# Patient Record
Sex: MALE | Race: WHITE | NOT HISPANIC OR LATINO | URBAN - METROPOLITAN AREA
[De-identification: names, ages, dates, MRNs, and addresses within clinical notes are randomized per-mention and may not be internally consistent; named-entity substitution may affect disease eponyms.]

---

## 2023-05-04 ENCOUNTER — INPATIENT HOSPITAL (OUTPATIENT)
Dept: URBAN - METROPOLITAN AREA HOSPITAL 50 | Facility: HOSPITAL | Age: 53
End: 2023-05-04

## 2023-05-04 DIAGNOSIS — R11.2 NAUSEA WITH VOMITING, UNSPECIFIED: ICD-10-CM

## 2023-05-04 DIAGNOSIS — K76.0 FATTY (CHANGE OF) LIVER, NOT ELSEWHERE CLASSIFIED: ICD-10-CM

## 2023-05-04 DIAGNOSIS — K21.9 GASTRO-ESOPHAGEAL REFLUX DISEASE WITHOUT ESOPHAGITIS: ICD-10-CM

## 2023-05-04 DIAGNOSIS — K85.90 ACUTE PANCREATITIS WITHOUT NECROSIS OR INFECTION, UNSPECIFIE: ICD-10-CM

## 2023-05-04 PROCEDURE — 99222 1ST HOSP IP/OBS MODERATE 55: CPT | Performed by: INTERNAL MEDICINE

## 2023-05-05 ENCOUNTER — INPATIENT HOSPITAL (OUTPATIENT)
Dept: URBAN - METROPOLITAN AREA HOSPITAL 50 | Facility: HOSPITAL | Age: 53
End: 2023-05-05

## 2023-05-05 DIAGNOSIS — K21.9 GASTRO-ESOPHAGEAL REFLUX DISEASE WITHOUT ESOPHAGITIS: ICD-10-CM

## 2023-05-05 DIAGNOSIS — R11.2 NAUSEA WITH VOMITING, UNSPECIFIED: ICD-10-CM

## 2023-05-05 DIAGNOSIS — K76.0 FATTY (CHANGE OF) LIVER, NOT ELSEWHERE CLASSIFIED: ICD-10-CM

## 2023-05-05 DIAGNOSIS — K85.90 ACUTE PANCREATITIS WITHOUT NECROSIS OR INFECTION, UNSPECIFIE: ICD-10-CM

## 2023-05-05 PROCEDURE — 99233 SBSQ HOSP IP/OBS HIGH 50: CPT | Performed by: NURSE PRACTITIONER

## 2024-01-16 ENCOUNTER — APPOINTMENT (OUTPATIENT)
Dept: SURGERY | Facility: CLINIC | Age: 54
End: 2024-01-16
Payer: COMMERCIAL

## 2024-01-16 NOTE — PROGRESS NOTES
Subjective   Patient ID: Marlon Valenzuela is a 53 y.o. male who has a hx of a perianal abscess with mushroom drain placement in 2017.    HPI  He noticed another lump near the anus and had an I&D in the ED a few times over the years. He went to Dr. Ochoa and the abscess was opened again and then 2 months later. He then developed a right groin abscess and went to the OR to have it drained and that abscess has healed.. He is still having drainage at the base of the scrotum and he will use hot compresses to make it drain and that will make the abscess almost subside.    MRI pelvis 5/2023   IMPRESSION:  No perianal fistula identified.     Two separate narrow subcutaneous tracts of fluid along the posterior  perineum, perhaps the residua of prior fluid collections, possibly  draining to the skin, but neither of which appear to communicate with  the perianal region.    Colonoscopy/EGD ?    Review of Systems    Objective   Physical Exam    Assessment/Plan   {Assess/PlanSmartLinks:94712}         TASHI Cantu-SINAI 01/15/24 8:50 PM

## 2024-01-17 ENCOUNTER — OFFICE VISIT (OUTPATIENT)
Dept: SURGERY | Facility: CLINIC | Age: 54
End: 2024-01-17
Payer: MEDICARE

## 2024-01-17 VITALS
TEMPERATURE: 97.4 F | HEIGHT: 72 IN | BODY MASS INDEX: 36.7 KG/M2 | WEIGHT: 271 LBS | HEART RATE: 62 BPM | DIASTOLIC BLOOD PRESSURE: 88 MMHG | SYSTOLIC BLOOD PRESSURE: 123 MMHG

## 2024-01-17 DIAGNOSIS — K61.0 PERIANAL ABSCESS: ICD-10-CM

## 2024-01-17 PROCEDURE — 99215 OFFICE O/P EST HI 40 MIN: CPT | Performed by: STUDENT IN AN ORGANIZED HEALTH CARE EDUCATION/TRAINING PROGRAM

## 2024-01-17 RX ORDER — ESOMEPRAZOLE MAGNESIUM 40 MG/1
40 CAPSULE, DELAYED RELEASE ORAL 2 TIMES DAILY
COMMUNITY
Start: 2024-01-11 | End: 2024-01-25

## 2024-01-17 RX ORDER — GABAPENTIN 600 MG/1
TABLET ORAL
COMMUNITY
Start: 2019-02-13 | End: 2024-01-20

## 2024-01-17 RX ORDER — DULOXETINE HYDROCHLORIDE 30 MG/1
CAPSULE, DELAYED RELEASE ORAL
COMMUNITY
Start: 2023-11-09

## 2024-01-17 RX ORDER — LISINOPRIL 10 MG/1
1 TABLET ORAL DAILY
COMMUNITY

## 2024-01-17 RX ORDER — FLUTICASONE FUROATE, UMECLIDINIUM BROMIDE AND VILANTEROL TRIFENATATE 100; 62.5; 25 UG/1; UG/1; UG/1
1 POWDER RESPIRATORY (INHALATION)
COMMUNITY
Start: 2023-10-05

## 2024-01-17 RX ORDER — ESCITALOPRAM OXALATE 10 MG/1
10 TABLET ORAL
COMMUNITY
Start: 2023-03-31

## 2024-01-17 RX ORDER — FENOFIBRATE 160 MG/1
160 TABLET ORAL DAILY
COMMUNITY
Start: 2023-02-16

## 2024-01-17 RX ORDER — ATORVASTATIN CALCIUM 20 MG/1
20 TABLET, FILM COATED ORAL
COMMUNITY
Start: 2023-12-20 | End: 2024-01-19

## 2024-01-17 RX ORDER — ATENOLOL 25 MG/1
25 TABLET ORAL
COMMUNITY
Start: 2023-03-31

## 2024-01-17 ASSESSMENT — ENCOUNTER SYMPTOMS
NAUSEA: 0
COUGH: 0
RECTAL PAIN: 1
ABDOMINAL PAIN: 0
VOMITING: 0
UNEXPECTED WEIGHT CHANGE: 0
APPETITE CHANGE: 0
CHILLS: 0
FEVER: 0
FATIGUE: 0
ACTIVITY CHANGE: 0
ANAL BLEEDING: 0
SHORTNESS OF BREATH: 0
BLOOD IN STOOL: 0
CONSTIPATION: 0
DIARRHEA: 0
CHEST TIGHTNESS: 0
DIAPHORESIS: 0

## 2024-01-17 NOTE — H&P (VIEW-ONLY)
HPI    aMrlon Valenzuela is a 53 y.o. male who was seen by Brenda Aguila CNP 5/2023 for a recurrent perianal abscess at the base of his scrotum. He was given a course of Augmentin and MRI pelvis was obtained. He had EUA, I&D with mushroom drain placed in 2017 by [facility]. He reports that he had an I&D performed in the ED a few times over the years. He has build up and drainage at least once a month. He reports that he had 2 surgeries on his bottom with Dr. Ochoa and was admitted for pancreatitis since his MRI in May.     MRI pelvis 5/2023: No perianal fistula identified. Two separate narrow subcutaneous tracts of fluid along the posterior perineum, perhaps the residua of prior fluid collections, possibly draining to the skin, but neither of which appear to communicate with the perianal region.     Colonoscopy 5/2023 ([facility]):   - Preparation of the colon was fair.   - Preparation of the colon was fair.   - The examined portion of the ileum was normal.   - Stool in the rectum and in the sigmoid colon.   - The examination was otherwise normal.   - The distal rectum and anal verge are normal on retroflexion view.   - No specimens collected.   -Repeat in 5 years.     Former smoker- quit 1 month ago and smoked since he was 10/No ETOH/No Illicit drug use  PMH: HTN, COPD, HLD, pancreatitis, LIBIA  PSH: Liver biopsy   No family history of CRC or IBD  Employment: [employer]    Past Medical History:   Diagnosis Date    Acute embolism and thrombosis of unspecified vein 04/20/2015    Venous thrombosis    Other intervertebral disc degeneration, lumbar region     Bulging lumbar disc    Personal history of other diseases of the circulatory system     History of aortic aneurysm    Personal history of other diseases of the nervous system and sense organs     History of sleep apnea    Personal history of other specified conditions 04/19/2016    History of headache    Personal history of other specified conditions 04/20/2015    History of chest  pain    Spondylosis, unspecified     Arthritis of back    White matter disease, unspecified     White matter abnormality on MRI of brain       Past Surgical History:   Procedure Laterality Date    CARDIAC CATHETERIZATION  10/21/2014    Cardiac Cath Procedure Outcome:    OTHER SURGICAL HISTORY  05/11/2017    Incision Of Perianal Abscess     Current Outpatient Medications   Medication Sig Dispense Refill    aspirin-calcium carbonate 81 mg-300 mg calcium(777 mg) tablet Take 1 tablet by mouth once daily.      atenolol (Tenormin) 25 mg tablet Take 1 tablet (25 mg) by mouth once daily.      atorvastatin (Lipitor) 20 mg tablet Take 1 tablet (20 mg) by mouth once daily.      Cymbalta 30 mg DR capsule Take by mouth.      escitalopram (Lexapro) 10 mg tablet Take 1 tablet (10 mg) by mouth once daily.      esomeprazole (NexIUM) 40 mg DR capsule Take 1 capsule (40 mg) by mouth twice a day.      fenofibrate (Triglide) 160 mg tablet Take 1 tablet (160 mg) by mouth once daily.      gabapentin (Neurontin) 600 mg tablet TAKE 1 TABLET BY MOUTH IN THE MORNING  1 TABLET AT NOON  AND 2 TABLETS AT NIGHT      lisinopril 10 mg tablet Take 1 tablet (10 mg) by mouth once daily.      Trelegy Ellipta 100-62.5-25 mcg blister with device Inhale 1 puff once daily.       No current facility-administered medications for this visit.     Allergies   Allergen Reactions    Sulfa (Sulfonamide Antibiotics) GI Upset       Review of Systems   Constitutional:  Negative for activity change, appetite change, chills, diaphoresis, fatigue, fever and unexpected weight change.   Respiratory:  Negative for cough, chest tightness and shortness of breath.    Gastrointestinal:  Positive for rectal pain. Negative for abdominal pain, anal bleeding, blood in stool, constipation, diarrhea, nausea and vomiting.   All other systems reviewed and are negative.      Physical Exam  Vitals reviewed. Exam conducted with a chaperone present.   Constitutional:       Appearance:  Normal appearance.   HENT:      Head: Normocephalic.   Eyes:      Pupils: Pupils are equal, round, and reactive to light.   Cardiovascular:      Rate and Rhythm: Normal rate and regular rhythm.      Pulses: Normal pulses.      Heart sounds: Normal heart sounds.   Pulmonary:      Effort: Pulmonary effort is normal.      Breath sounds: Normal breath sounds.   Abdominal:      General: There is no distension.      Palpations: Abdomen is soft. There is no mass.      Tenderness: There is no abdominal tenderness.      Hernia: No hernia is present.   Genitourinary:     Comments: Left anterior pinhole opening without fluctuance or drainage   Musculoskeletal:         General: Normal range of motion.      Cervical back: Normal range of motion.   Skin:     General: Skin is warm and dry.      Capillary Refill: Capillary refill takes less than 2 seconds.   Neurological:      General: No focal deficit present.      Mental Status: He is alert and oriented to person, place, and time. Mental status is at baseline.   Psychiatric:         Mood and Affect: Mood normal.         Behavior: Behavior normal.         Thought Content: Thought content normal.         Judgment: Judgment normal.     Anorectal:  Small pinpoint sinus in the left anterior position without surrounding erythema/edema.  There is minimal induration but no edema.  Area is mildly TTP.  No other abnormalities visible.    Interpreted By:  JUJU BARRIOS MD  MRN: [id number]  Patient Name: LYRIC BURCIAGA     STUDY:  MRI PELVIS W/O-W CONTRAST;  5/30/2023 12:59 pm     INDICATION:  anal fistula  K60.3: Anal fistula     COMPARISON:  None.     ACCESSION NUMBER(S):  [id number]     ORDERING CLINICIAN:  JAMES KERR     TECHNIQUE:  Multiplanar MRI of the pelvis was obtained including axial, sagittal  and coronal T2 weighted SSFSE, (para)axial, (para)coronal and  sagittal T2 FSE , axial DWI, pre and post gadolinium dynamic T1 GRE  sequences in 3 planes.  24 ml of Gadolinium contrast  agent Dotarem were administered  intravenously without complication.     FINDINGS:  No perianal fistula is identified.  Within the right posterior perineum, there is a narrow tract of fluid  with hyperenhancement measuring about 11 mm in length on series 9,  image 32/54.  Within the posterior perineum extending into the medial left thigh,  there is a separate approximately 11 mm in length narrow subcutaneous  tract of fluid as annotated on series 9, image 40/54. This  demonstrates some hyperenhancement. These may empty into the skin  however neither extends to the perianal region.     The anal canal appears intact.     Distal loops of bowel do not appear dilated. No pelvic free fluid.     Urinary bladder is nondistended.     Prostate measures 4.1 cm transverse. Visualized portion of the  seminal vesicles appears symmetric.     No pelvic lymphadenopathy.     No abnormal marrow enhancement is visualized.     IMPRESSION:  No perianal fistula identified.     Two separate narrow subcutaneous tracts of fluid along the posterior  perineum, perhaps the residua of prior fluid collections, possibly  draining to the skin, but neither of which appear to communicate with  the perianal region.    Assessment and Plan:   #Perianal abscess vs subcutaneous fistula, left anterior position, no fistula on MRI exam  -  For EUA, possible fistulotomy, possible I&D abscess, possible insertion jose Portillo MD   1/17/2024  9:25 AM

## 2024-01-17 NOTE — PROGRESS NOTES
HPI    Marlon Valenzuela is a 53 y.o. male who was seen by Brenda Aguila CNP 5/2023 for a recurrent perianal abscess at the base of his scrotum. He was given a course of Augmentin and MRI pelvis was obtained. He had EUA, I&D with mushroom drain placed in 2017 by [facility]. He reports that he had an I&D performed in the ED a few times over the years. He has build up and drainage at least once a month. He reports that he had 2 surgeries on his bottom with Dr. Ochoa and was admitted for pancreatitis since his MRI in May.     MRI pelvis 5/2023: No perianal fistula identified. Two separate narrow subcutaneous tracts of fluid along the posterior perineum, perhaps the residua of prior fluid collections, possibly draining to the skin, but neither of which appear to communicate with the perianal region.     Colonoscopy 5/2023 ([facility]):   - Preparation of the colon was fair.   - Preparation of the colon was fair.   - The examined portion of the ileum was normal.   - Stool in the rectum and in the sigmoid colon.   - The examination was otherwise normal.   - The distal rectum and anal verge are normal on retroflexion view.   - No specimens collected.   -Repeat in 5 years.     Former smoker- quit 1 month ago and smoked since he was 10/No ETOH/No Illicit drug use  PMH: HTN, COPD, HLD, pancreatitis, LIBIA  PSH: Liver biopsy   No family history of CRC or IBD  Employment: [employer]    Past Medical History:   Diagnosis Date    Acute embolism and thrombosis of unspecified vein 04/20/2015    Venous thrombosis    Other intervertebral disc degeneration, lumbar region     Bulging lumbar disc    Personal history of other diseases of the circulatory system     History of aortic aneurysm    Personal history of other diseases of the nervous system and sense organs     History of sleep apnea    Personal history of other specified conditions 04/19/2016    History of headache    Personal history of other specified conditions 04/20/2015    History of chest  pain    Spondylosis, unspecified     Arthritis of back    White matter disease, unspecified     White matter abnormality on MRI of brain       Past Surgical History:   Procedure Laterality Date    CARDIAC CATHETERIZATION  10/21/2014    Cardiac Cath Procedure Outcome:    OTHER SURGICAL HISTORY  05/11/2017    Incision Of Perianal Abscess     Current Outpatient Medications   Medication Sig Dispense Refill    aspirin-calcium carbonate 81 mg-300 mg calcium(777 mg) tablet Take 1 tablet by mouth once daily.      atenolol (Tenormin) 25 mg tablet Take 1 tablet (25 mg) by mouth once daily.      atorvastatin (Lipitor) 20 mg tablet Take 1 tablet (20 mg) by mouth once daily.      Cymbalta 30 mg DR capsule Take by mouth.      escitalopram (Lexapro) 10 mg tablet Take 1 tablet (10 mg) by mouth once daily.      esomeprazole (NexIUM) 40 mg DR capsule Take 1 capsule (40 mg) by mouth twice a day.      fenofibrate (Triglide) 160 mg tablet Take 1 tablet (160 mg) by mouth once daily.      gabapentin (Neurontin) 600 mg tablet TAKE 1 TABLET BY MOUTH IN THE MORNING  1 TABLET AT NOON  AND 2 TABLETS AT NIGHT      lisinopril 10 mg tablet Take 1 tablet (10 mg) by mouth once daily.      Trelegy Ellipta 100-62.5-25 mcg blister with device Inhale 1 puff once daily.       No current facility-administered medications for this visit.     Allergies   Allergen Reactions    Sulfa (Sulfonamide Antibiotics) GI Upset       Review of Systems   Constitutional:  Negative for activity change, appetite change, chills, diaphoresis, fatigue, fever and unexpected weight change.   Respiratory:  Negative for cough, chest tightness and shortness of breath.    Gastrointestinal:  Positive for rectal pain. Negative for abdominal pain, anal bleeding, blood in stool, constipation, diarrhea, nausea and vomiting.   All other systems reviewed and are negative.      Physical Exam  Vitals reviewed. Exam conducted with a chaperone present.   Constitutional:       Appearance:  Normal appearance.   HENT:      Head: Normocephalic.   Eyes:      Pupils: Pupils are equal, round, and reactive to light.   Cardiovascular:      Rate and Rhythm: Normal rate and regular rhythm.      Pulses: Normal pulses.      Heart sounds: Normal heart sounds.   Pulmonary:      Effort: Pulmonary effort is normal.      Breath sounds: Normal breath sounds.   Abdominal:      General: There is no distension.      Palpations: Abdomen is soft. There is no mass.      Tenderness: There is no abdominal tenderness.      Hernia: No hernia is present.   Genitourinary:     Comments: Left anterior pinhole opening without fluctuance or drainage   Musculoskeletal:         General: Normal range of motion.      Cervical back: Normal range of motion.   Skin:     General: Skin is warm and dry.      Capillary Refill: Capillary refill takes less than 2 seconds.   Neurological:      General: No focal deficit present.      Mental Status: He is alert and oriented to person, place, and time. Mental status is at baseline.   Psychiatric:         Mood and Affect: Mood normal.         Behavior: Behavior normal.         Thought Content: Thought content normal.         Judgment: Judgment normal.     Anorectal:  Small pinpoint sinus in the left anterior position without surrounding erythema/edema.  There is minimal induration but no edema.  Area is mildly TTP.  No other abnormalities visible.    Interpreted By:  JUJU BARRIOS MD  MRN: [id number]  Patient Name: LYRIC BURCIAGA     STUDY:  MRI PELVIS W/O-W CONTRAST;  5/30/2023 12:59 pm     INDICATION:  anal fistula  K60.3: Anal fistula     COMPARISON:  None.     ACCESSION NUMBER(S):  [id number]     ORDERING CLINICIAN:  JAMES KERR     TECHNIQUE:  Multiplanar MRI of the pelvis was obtained including axial, sagittal  and coronal T2 weighted SSFSE, (para)axial, (para)coronal and  sagittal T2 FSE , axial DWI, pre and post gadolinium dynamic T1 GRE  sequences in 3 planes.  24 ml of Gadolinium contrast  agent Dotarem were administered  intravenously without complication.     FINDINGS:  No perianal fistula is identified.  Within the right posterior perineum, there is a narrow tract of fluid  with hyperenhancement measuring about 11 mm in length on series 9,  image 32/54.  Within the posterior perineum extending into the medial left thigh,  there is a separate approximately 11 mm in length narrow subcutaneous  tract of fluid as annotated on series 9, image 40/54. This  demonstrates some hyperenhancement. These may empty into the skin  however neither extends to the perianal region.     The anal canal appears intact.     Distal loops of bowel do not appear dilated. No pelvic free fluid.     Urinary bladder is nondistended.     Prostate measures 4.1 cm transverse. Visualized portion of the  seminal vesicles appears symmetric.     No pelvic lymphadenopathy.     No abnormal marrow enhancement is visualized.     IMPRESSION:  No perianal fistula identified.     Two separate narrow subcutaneous tracts of fluid along the posterior  perineum, perhaps the residua of prior fluid collections, possibly  draining to the skin, but neither of which appear to communicate with  the perianal region.    Assessment and Plan:   #Perianal abscess vs subcutaneous fistula, left anterior position, no fistula on MRI exam  -  For EUA, possible fistulotomy, possible I&D abscess, possible insertion jose Portillo MD   1/17/2024  9:25 AM

## 2024-01-19 DIAGNOSIS — K61.0 PERIANAL ABSCESS: ICD-10-CM

## 2024-01-30 ENCOUNTER — ANESTHESIA EVENT (OUTPATIENT)
Dept: OPERATING ROOM | Facility: HOSPITAL | Age: 54
End: 2024-01-30
Payer: MEDICARE

## 2024-01-30 ENCOUNTER — HOSPITAL ENCOUNTER (OUTPATIENT)
Facility: HOSPITAL | Age: 54
Setting detail: OUTPATIENT SURGERY
Discharge: HOME | End: 2024-01-30
Attending: STUDENT IN AN ORGANIZED HEALTH CARE EDUCATION/TRAINING PROGRAM | Admitting: STUDENT IN AN ORGANIZED HEALTH CARE EDUCATION/TRAINING PROGRAM
Payer: MEDICARE

## 2024-01-30 ENCOUNTER — ANESTHESIA (OUTPATIENT)
Dept: OPERATING ROOM | Facility: HOSPITAL | Age: 54
End: 2024-01-30
Payer: MEDICARE

## 2024-01-30 VITALS
HEART RATE: 49 BPM | OXYGEN SATURATION: 95 % | BODY MASS INDEX: 35.21 KG/M2 | HEIGHT: 72 IN | TEMPERATURE: 97 F | SYSTOLIC BLOOD PRESSURE: 137 MMHG | DIASTOLIC BLOOD PRESSURE: 86 MMHG | RESPIRATION RATE: 14 BRPM | WEIGHT: 260 LBS

## 2024-01-30 DIAGNOSIS — K61.0 PERIANAL ABSCESS: ICD-10-CM

## 2024-01-30 DIAGNOSIS — L02.215 PERINEAL ABSCESS: Primary | ICD-10-CM

## 2024-01-30 PROCEDURE — 0752T DGTZ GLS MCRSCP SLD LVL III: CPT | Mod: TC,SUR,STJLAB | Performed by: STUDENT IN AN ORGANIZED HEALTH CARE EDUCATION/TRAINING PROGRAM

## 2024-01-30 PROCEDURE — 7100000009 HC PHASE TWO TIME - INITIAL BASE CHARGE: Performed by: STUDENT IN AN ORGANIZED HEALTH CARE EDUCATION/TRAINING PROGRAM

## 2024-01-30 PROCEDURE — 3600000003 HC OR TIME - INITIAL BASE CHARGE - PROCEDURE LEVEL THREE: Performed by: STUDENT IN AN ORGANIZED HEALTH CARE EDUCATION/TRAINING PROGRAM

## 2024-01-30 PROCEDURE — 2500000004 HC RX 250 GENERAL PHARMACY W/ HCPCS (ALT 636 FOR OP/ED): Performed by: ANESTHESIOLOGY

## 2024-01-30 PROCEDURE — 7100000001 HC RECOVERY ROOM TIME - INITIAL BASE CHARGE: Performed by: STUDENT IN AN ORGANIZED HEALTH CARE EDUCATION/TRAINING PROGRAM

## 2024-01-30 PROCEDURE — 88304 TISSUE EXAM BY PATHOLOGIST: CPT | Performed by: PATHOLOGY

## 2024-01-30 PROCEDURE — 2500000005 HC RX 250 GENERAL PHARMACY W/O HCPCS: Performed by: NURSE ANESTHETIST, CERTIFIED REGISTERED

## 2024-01-30 PROCEDURE — 2500000001 HC RX 250 WO HCPCS SELF ADMINISTERED DRUGS (ALT 637 FOR MEDICARE OP): Performed by: ANESTHESIOLOGY

## 2024-01-30 PROCEDURE — 46270 REMOVE ANAL FIST SUBQ: CPT | Performed by: STUDENT IN AN ORGANIZED HEALTH CARE EDUCATION/TRAINING PROGRAM

## 2024-01-30 PROCEDURE — 3700000002 HC GENERAL ANESTHESIA TIME - EACH INCREMENTAL 1 MINUTE: Performed by: STUDENT IN AN ORGANIZED HEALTH CARE EDUCATION/TRAINING PROGRAM

## 2024-01-30 PROCEDURE — 2500000004 HC RX 250 GENERAL PHARMACY W/ HCPCS (ALT 636 FOR OP/ED): Performed by: STUDENT IN AN ORGANIZED HEALTH CARE EDUCATION/TRAINING PROGRAM

## 2024-01-30 PROCEDURE — 3700000001 HC GENERAL ANESTHESIA TIME - INITIAL BASE CHARGE: Performed by: STUDENT IN AN ORGANIZED HEALTH CARE EDUCATION/TRAINING PROGRAM

## 2024-01-30 PROCEDURE — 7100000010 HC PHASE TWO TIME - EACH INCREMENTAL 1 MINUTE: Performed by: STUDENT IN AN ORGANIZED HEALTH CARE EDUCATION/TRAINING PROGRAM

## 2024-01-30 PROCEDURE — 3600000008 HC OR TIME - EACH INCREMENTAL 1 MINUTE - PROCEDURE LEVEL THREE: Performed by: STUDENT IN AN ORGANIZED HEALTH CARE EDUCATION/TRAINING PROGRAM

## 2024-01-30 PROCEDURE — 7100000002 HC RECOVERY ROOM TIME - EACH INCREMENTAL 1 MINUTE: Performed by: STUDENT IN AN ORGANIZED HEALTH CARE EDUCATION/TRAINING PROGRAM

## 2024-01-30 PROCEDURE — A46270 PR REMOVAL ANAL FISTULA,SUBCUTANEOUS: Performed by: ANESTHESIOLOGY

## 2024-01-30 PROCEDURE — 2500000004 HC RX 250 GENERAL PHARMACY W/ HCPCS (ALT 636 FOR OP/ED): Performed by: NURSE ANESTHETIST, CERTIFIED REGISTERED

## 2024-01-30 PROCEDURE — A46270 PR REMOVAL ANAL FISTULA,SUBCUTANEOUS: Performed by: NURSE ANESTHETIST, CERTIFIED REGISTERED

## 2024-01-30 RX ORDER — HYDRALAZINE HYDROCHLORIDE 20 MG/ML
5 INJECTION INTRAMUSCULAR; INTRAVENOUS EVERY 30 MIN PRN
Status: DISCONTINUED | OUTPATIENT
Start: 2024-01-30 | End: 2024-01-30 | Stop reason: HOSPADM

## 2024-01-30 RX ORDER — OXYCODONE HYDROCHLORIDE 10 MG/1
10 TABLET ORAL EVERY 4 HOURS PRN
Status: DISCONTINUED | OUTPATIENT
Start: 2024-01-30 | End: 2024-01-30 | Stop reason: HOSPADM

## 2024-01-30 RX ORDER — SODIUM CHLORIDE, SODIUM LACTATE, POTASSIUM CHLORIDE, CALCIUM CHLORIDE 600; 310; 30; 20 MG/100ML; MG/100ML; MG/100ML; MG/100ML
INJECTION, SOLUTION INTRAVENOUS CONTINUOUS PRN
Status: DISCONTINUED | OUTPATIENT
Start: 2024-01-30 | End: 2024-01-30

## 2024-01-30 RX ORDER — ACETAMINOPHEN 325 MG/1
975 TABLET ORAL ONCE
Status: DISCONTINUED | OUTPATIENT
Start: 2024-01-30 | End: 2024-01-30 | Stop reason: HOSPADM

## 2024-01-30 RX ORDER — HYDROMORPHONE HYDROCHLORIDE 1 MG/ML
0.2 INJECTION, SOLUTION INTRAMUSCULAR; INTRAVENOUS; SUBCUTANEOUS EVERY 5 MIN PRN
Status: DISCONTINUED | OUTPATIENT
Start: 2024-01-30 | End: 2024-01-30 | Stop reason: HOSPADM

## 2024-01-30 RX ORDER — ONDANSETRON HYDROCHLORIDE 2 MG/ML
INJECTION, SOLUTION INTRAVENOUS AS NEEDED
Status: DISCONTINUED | OUTPATIENT
Start: 2024-01-30 | End: 2024-01-30

## 2024-01-30 RX ORDER — FENTANYL CITRATE 50 UG/ML
INJECTION, SOLUTION INTRAMUSCULAR; INTRAVENOUS AS NEEDED
Status: DISCONTINUED | OUTPATIENT
Start: 2024-01-30 | End: 2024-01-30

## 2024-01-30 RX ORDER — MIDAZOLAM HYDROCHLORIDE 1 MG/ML
INJECTION, SOLUTION INTRAMUSCULAR; INTRAVENOUS AS NEEDED
Status: DISCONTINUED | OUTPATIENT
Start: 2024-01-30 | End: 2024-01-30

## 2024-01-30 RX ORDER — OXYCODONE AND ACETAMINOPHEN 5; 325 MG/1; MG/1
1 TABLET ORAL EVERY 6 HOURS PRN
Qty: 8 TABLET | Refills: 0 | Status: SHIPPED | OUTPATIENT
Start: 2024-01-30

## 2024-01-30 RX ORDER — METRONIDAZOLE 500 MG/100ML
INJECTION, SOLUTION INTRAVENOUS AS NEEDED
Status: DISCONTINUED | OUTPATIENT
Start: 2024-01-30 | End: 2024-01-30

## 2024-01-30 RX ORDER — ALBUTEROL SULFATE 0.83 MG/ML
2.5 SOLUTION RESPIRATORY (INHALATION) ONCE AS NEEDED
Status: DISCONTINUED | OUTPATIENT
Start: 2024-01-30 | End: 2024-01-30 | Stop reason: HOSPADM

## 2024-01-30 RX ORDER — CEFAZOLIN SODIUM 2 G/100ML
INJECTION, SOLUTION INTRAVENOUS AS NEEDED
Status: DISCONTINUED | OUTPATIENT
Start: 2024-01-30 | End: 2024-01-30

## 2024-01-30 RX ORDER — OXYCODONE HYDROCHLORIDE 5 MG/1
5 TABLET ORAL EVERY 4 HOURS PRN
Status: DISCONTINUED | OUTPATIENT
Start: 2024-01-30 | End: 2024-01-30 | Stop reason: HOSPADM

## 2024-01-30 RX ORDER — DEXAMETHASONE SODIUM PHOSPHATE 4 MG/ML
INJECTION, SOLUTION INTRA-ARTICULAR; INTRALESIONAL; INTRAMUSCULAR; INTRAVENOUS; SOFT TISSUE AS NEEDED
Status: DISCONTINUED | OUTPATIENT
Start: 2024-01-30 | End: 2024-01-30

## 2024-01-30 RX ORDER — BUPIVACAINE HYDROCHLORIDE 5 MG/ML
INJECTION, SOLUTION EPIDURAL; INTRACAUDAL AS NEEDED
Status: DISCONTINUED | OUTPATIENT
Start: 2024-01-30 | End: 2024-01-30 | Stop reason: HOSPADM

## 2024-01-30 RX ORDER — DIPHENHYDRAMINE HYDROCHLORIDE 50 MG/ML
12.5 INJECTION INTRAMUSCULAR; INTRAVENOUS ONCE AS NEEDED
Status: DISCONTINUED | OUTPATIENT
Start: 2024-01-30 | End: 2024-01-30 | Stop reason: HOSPADM

## 2024-01-30 RX ORDER — FAMOTIDINE 10 MG/ML
20 INJECTION INTRAVENOUS ONCE
Status: DISCONTINUED | OUTPATIENT
Start: 2024-01-30 | End: 2024-01-30 | Stop reason: HOSPADM

## 2024-01-30 RX ORDER — PROPOFOL 10 MG/ML
INJECTION, EMULSION INTRAVENOUS AS NEEDED
Status: DISCONTINUED | OUTPATIENT
Start: 2024-01-30 | End: 2024-01-30

## 2024-01-30 RX ORDER — LIDOCAINE HYDROCHLORIDE 20 MG/ML
INJECTION, SOLUTION INFILTRATION; PERINEURAL AS NEEDED
Status: DISCONTINUED | OUTPATIENT
Start: 2024-01-30 | End: 2024-01-30

## 2024-01-30 RX ORDER — SODIUM CHLORIDE, SODIUM LACTATE, POTASSIUM CHLORIDE, CALCIUM CHLORIDE 600; 310; 30; 20 MG/100ML; MG/100ML; MG/100ML; MG/100ML
100 INJECTION, SOLUTION INTRAVENOUS CONTINUOUS
Status: DISCONTINUED | OUTPATIENT
Start: 2024-01-30 | End: 2024-01-30 | Stop reason: HOSPADM

## 2024-01-30 RX ORDER — ONDANSETRON HYDROCHLORIDE 2 MG/ML
4 INJECTION, SOLUTION INTRAVENOUS ONCE AS NEEDED
Status: DISCONTINUED | OUTPATIENT
Start: 2024-01-30 | End: 2024-01-30 | Stop reason: HOSPADM

## 2024-01-30 RX ORDER — LIDOCAINE HYDROCHLORIDE 10 MG/ML
0.1 INJECTION, SOLUTION EPIDURAL; INFILTRATION; INTRACAUDAL; PERINEURAL ONCE
Status: DISCONTINUED | OUTPATIENT
Start: 2024-01-30 | End: 2024-01-30 | Stop reason: HOSPADM

## 2024-01-30 RX ORDER — METOCLOPRAMIDE HYDROCHLORIDE 5 MG/ML
10 INJECTION INTRAMUSCULAR; INTRAVENOUS ONCE AS NEEDED
Status: DISCONTINUED | OUTPATIENT
Start: 2024-01-30 | End: 2024-01-30 | Stop reason: HOSPADM

## 2024-01-30 RX ORDER — MEPERIDINE HYDROCHLORIDE 50 MG/ML
12.5 INJECTION INTRAMUSCULAR; INTRAVENOUS; SUBCUTANEOUS EVERY 10 MIN PRN
Status: DISCONTINUED | OUTPATIENT
Start: 2024-01-30 | End: 2024-01-30 | Stop reason: HOSPADM

## 2024-01-30 RX ORDER — LABETALOL HYDROCHLORIDE 5 MG/ML
5 INJECTION, SOLUTION INTRAVENOUS ONCE AS NEEDED
Status: DISCONTINUED | OUTPATIENT
Start: 2024-01-30 | End: 2024-01-30 | Stop reason: HOSPADM

## 2024-01-30 RX ORDER — HYDROMORPHONE HYDROCHLORIDE 1 MG/ML
0.5 INJECTION, SOLUTION INTRAMUSCULAR; INTRAVENOUS; SUBCUTANEOUS EVERY 5 MIN PRN
Status: DISCONTINUED | OUTPATIENT
Start: 2024-01-30 | End: 2024-01-30 | Stop reason: HOSPADM

## 2024-01-30 RX ADMIN — FENTANYL CITRATE 50 MCG: 50 INJECTION, SOLUTION INTRAMUSCULAR; INTRAVENOUS at 08:04

## 2024-01-30 RX ADMIN — FENTANYL CITRATE 50 MCG: 50 INJECTION, SOLUTION INTRAMUSCULAR; INTRAVENOUS at 07:40

## 2024-01-30 RX ADMIN — SODIUM CHLORIDE, POTASSIUM CHLORIDE, SODIUM LACTATE AND CALCIUM CHLORIDE: 600; 310; 30; 20 INJECTION, SOLUTION INTRAVENOUS at 07:26

## 2024-01-30 RX ADMIN — METRONIDAZOLE 500 MG: 500 INJECTION, SOLUTION INTRAVENOUS at 07:40

## 2024-01-30 RX ADMIN — METRONIDAZOLE 30 MG: 500 INJECTION, SOLUTION INTRAVENOUS at 08:22

## 2024-01-30 RX ADMIN — ONDANSETRON 4 MG: 2 INJECTION INTRAMUSCULAR; INTRAVENOUS at 07:50

## 2024-01-30 RX ADMIN — LIDOCAINE HYDROCHLORIDE 60 MG: 20 INJECTION, SOLUTION INFILTRATION; PERINEURAL at 07:40

## 2024-01-30 RX ADMIN — PROPOFOL 200 MG: 10 INJECTION, EMULSION INTRAVENOUS at 07:40

## 2024-01-30 RX ADMIN — DEXAMETHASONE SODIUM PHOSPHATE 4 MG: 4 INJECTION INTRA-ARTICULAR; INTRALESIONAL; INTRAMUSCULAR; INTRAVENOUS; SOFT TISSUE at 07:44

## 2024-01-30 RX ADMIN — OXYCODONE HYDROCHLORIDE 5 MG: 5 TABLET ORAL at 10:45

## 2024-01-30 RX ADMIN — CEFAZOLIN SODIUM 2 G: 2 INJECTION, SOLUTION INTRAVENOUS at 07:41

## 2024-01-30 RX ADMIN — FENTANYL CITRATE 50 MCG: 50 INJECTION, SOLUTION INTRAMUSCULAR; INTRAVENOUS at 07:58

## 2024-01-30 RX ADMIN — HYDROMORPHONE HYDROCHLORIDE 0.5 MG: 1 INJECTION, SOLUTION INTRAMUSCULAR; INTRAVENOUS; SUBCUTANEOUS at 09:45

## 2024-01-30 RX ADMIN — MIDAZOLAM 2 MG: 1 INJECTION INTRAMUSCULAR; INTRAVENOUS at 07:35

## 2024-01-30 ASSESSMENT — PAIN DESCRIPTION - DESCRIPTORS
DESCRIPTORS: BURNING;SHARP
DESCRIPTORS: DULL;PRESSURE
DESCRIPTORS: BURNING;SHARP
DESCRIPTORS: PRESSURE;DULL

## 2024-01-30 ASSESSMENT — PAIN - FUNCTIONAL ASSESSMENT
PAIN_FUNCTIONAL_ASSESSMENT: 0-10

## 2024-01-30 ASSESSMENT — PAIN SCALES - GENERAL
PAINLEVEL_OUTOF10: 4
PAINLEVEL_OUTOF10: 6
PAINLEVEL_OUTOF10: 3
PAINLEVEL_OUTOF10: 1
PAINLEVEL_OUTOF10: 2
PAINLEVEL_OUTOF10: 4
PAINLEVEL_OUTOF10: 9
PAINLEVEL_OUTOF10: 1
PAIN_LEVEL: 0

## 2024-01-30 NOTE — DISCHARGE INSTRUCTIONS
WOUND CARE:  Recommend wearing panty-liner or applying gauze between buttocks to keep undergarments clean; change at least daily.  Remove packing in 24 hours.  Do not repack area; keep area covered with clean/dry gauze.    PAIN CONTROL:  Can utilize prescribed percocet as needed.  Do not combine percocet with tylenol to avoid exceeding safe daily recommended intake of acetaminophen.  Can utilize ibuprofen.  OK to shower 24 hours after surgery.  OK to take sitz baths two-three times daily as needed 24 hours after surgery.  Take colace as prescribed to keep stools soft.    ACTIVITY:  Do not drive or operative heavy machinery for at least first 24 hours after surgery; if you do not feel able to safely operate after first 24 hours or are taking narcotics (ie percocet or vicodin) then do not operative heavy machinery or drive.    DIET:  Resume previous diet.    FOLLOW-UP:   Please call office at [phone] to schedule follow-up appointment for 3-4 weeks from date of surgery.

## 2024-01-30 NOTE — BRIEF OP NOTE
Date: 2024  OR Location: [facility] OR    Name: Marlon Valenzuela, : 1970, Age: 53 y.o., MRN: [id number], Sex: male    Diagnosis  Pre-op Diagnosis     * Perianal abscess [K61.0] Post-op Diagnosis     * Perineal abscess [L02.215]     Procedures  Exam under anesthesia, anorectal  Excision of perineal abscess    Surgeons      * Toño Portillo - Primary    Resident/Fellow/Other Assistant:  Surgeon(s) and Role:    Procedure Summary  Anesthesia: General  ASA: III  Anesthesia Staff: Anesthesiologist: Lenin Matthew DO  C-AA: CAROLYN Garza  Estimated Blood Loss: 15mL  Intra-op Medications:   Administrations occurring from 0730 to 0855 on 24:   Medication Name Total Dose   bupivacaine PF (Marcaine) 0.5 % (5 mg/mL) injection 30 mL              Anesthesia Record               Intraprocedure I/O Totals          Intake    LR infusion 600.00 mL    Total Intake 600 mL       Output    Est. Blood Loss 15 mL    Total Output 15 mL       Net    Net Volume 585 mL          Specimen:   ID Type Source Tests Collected by Time   1 : PERINEAL ABSCESS Tissue ABSCESS SURGICAL PATHOLOGY EXAM Toño Portillo MD 2024 0808        Staff:   Circulator: Samantha Gerer RN  Scrub Person: June Brock          Findings: Right and left lateral external hemorrhoids non-thrombosed.  Right lateral, left anterior, left posterior internal hemorrhoids.  Perineal abscess with draining sinus in left anterior position, no tracking to anus/rectum.  Abscess cavity excised.      Complications:  None; patient tolerated the procedure well.     Disposition: PACU - hemodynamically stable.  Condition: stable  Specimens Collected:   ID Type Source Tests Collected by Time   1 : PERINEAL ABSCESS Tissue ABSCESS SURGICAL PATHOLOGY EXAM Toño Portillo MD 2024 0808     Attending Attestation: I was present and scrubbed for the entire procedure.    Toño Portillo  Phone Number: [phone]

## 2024-01-30 NOTE — OP NOTE
Exam Under Anesthesia Anus/Rectum, EXCISION OF PERINEAL ABSCESS Operative Note     Date: 2024  OR Location: [facility] OR    Name: Marlon Valenzuela, : 1970, Age: 53 y.o., MRN: [id number], Sex: male    Diagnosis  Pre-op Diagnosis     * Perianal abscess [K61.0] Post-op Diagnosis     * Perineal abscess [L02.215]     Procedures  Exam under anesthesia, anorectal  Excision of perineal abscess    Surgeons      * Toño Portillo - Primary    Resident/Fellow/Other Assistant:  Surgeon(s) and Role:    Procedure Summary  Anesthesia: General  ASA: III  Anesthesia Staff: Anesthesiologist: Lenin Matthew DO  C-AA: CAROLYN Garza  Estimated Blood Loss: 15mL  Intra-op Medications:   Administrations occurring from 0730 to 0855 on 24:   Medication Name Total Dose   bupivacaine PF (Marcaine) 0.5 % (5 mg/mL) injection 30 mL              Anesthesia Record               Intraprocedure I/O Totals          Intake    LR infusion 600.00 mL    ceFAZolin in dextrose (iso-os) 2 gram/100 mL 100.00 mL    metroNIDAZOLE 500 mg/100 mL 106.00 mL    Total Intake 806 mL       Output    Est. Blood Loss 15 mL    Total Output 15 mL       Net    Net Volume 791 mL          Specimen:   ID Type Source Tests Collected by Time   1 : PERINEAL ABSCESS Tissue ABSCESS SURGICAL PATHOLOGY EXAM Toño Portillo MD 2024 0808        Staff:   Circulator: Samantha Greer RN  Scrub Person: June Vinod         Drains and/or Catheters: * None in log *    Tourniquet Times:         Implants:     Findings:  Right and left lateral external hemorrhoids non-thrombosed.  Right lateral, left anterior, left posterior internal hemorrhoids.  Perineal abscess with draining sinus in left anterior position, no tracking to anus/rectum.  Abscess cavity excised.     Indications: Marlon Valenzuela is an 53 y.o. male who is having surgery for Perianal abscess [K61.0].     The patient was seen in the preoperative area. The risks, benefits, complications, treatment options,  non-operative alternatives, expected recovery and outcomes were discussed with the patient. The possibilities of reaction to medication, pulmonary aspiration, injury to surrounding structures, bleeding, recurrent infection, the need for additional procedures, failure to diagnose a condition, and creating a complication requiring transfusion or operation were discussed with the patient. The patient concurred with the proposed plan, giving informed consent.  The site of surgery was properly noted/marked if necessary per policy. The patient has been actively warmed in preoperative area. Preoperative antibiotics have been ordered and given within 1 hours of incision. Venous thrombosis prophylaxis have been ordered including bilateral sequential compression devices    Procedure Details: Safety checklist was performed in preoperative area confirming correct patient and correct procedure.  Patient was brought to the operating room.  Sequential pressure devices were applied to bilateral lower extremities.  Following induction of general anesthesia, the patient was placed in lithotomy position using yellowfin stirrups.  Bilateral arms were abducted and gently fixed armboards.  Hair of the perineum and perianal region were trimmed with any traumatic hair clipper.  The perineum and perianal space were prepped with Betadine solution and the patient draped in the usual sterile fashion.  Timeout was performed, once again confirming correct patient and correct procedure.  Perioperative antibiotics were administered.    Examination of the perineum and perianal region demonstrated an open sinus in the left anterior perianal region.  There was mild surrounding induration but no active drainage and no erythema or fluctuance.  There were nonthrombosed external hemorrhoids in the right and left lateral positions.  A well-lubricated digital rectal exam was performed and negative for palpable lesion or mass.  Hill-Schmitt retractor was  inserted into the anal rectal canal.  A 360 degree evaluation of the anal rectal canal demonstrated internal hemorrhoids in the right lateral, left anterior, and left posterior positions.  There were otherwise no visualized masses or lesions.  The anal rectal canal mucosa appeared otherwise unremarkable.  The Hill-Schmitt retractor was removed from the anal rectal canal.  A small lacrimal probe was inserted into the open sinus in the left anterior perianal region.  An underlying fistulous tract was identified extending from the opening sinus towards the base of the scrotum crossing midline towards the right side.  The overlying skin was incised using electrocautery.  There was granulation tissue within the underlying cavity which was debrided using a curette forceps.  Additional probing of the underlying wound using the lacrimal probe demonstrated no communication to the anal rectal canal.  Decision was made to excise the underlying cavity.  This was performed using electrocautery.  Hemostasis was achieved using electrocautery.  The specimen was passed off the table for final pathology.  Analgesia of the surrounding soft tissue was provide using half percent Marcaine.  The perineum and perianal region was cleansed and dried.  Surgical count was performed confirmed to be correct.  Dry dressings were applied to the region.  Signout was performed.  Patient was awakened and taken to recovery area in stable condition.      Complications:  None; patient tolerated the procedure well.    Disposition: PACU - hemodynamically stable.  Condition: stable         Additional Details: N/A    Attending Attestation: I was present and scrubbed for the entire procedure.    Toño Portillo  Phone Number: [phone]

## 2024-01-30 NOTE — ANESTHESIA POSTPROCEDURE EVALUATION
Patient: Marlon Valenzuela    Procedure Summary       Date: 01/30/24 Room / Location: [facility] OR 03 / [facility]    Anesthesia Start: 0726 Anesthesia Stop:     Procedures:       Exam Under Anesthesia Anus/Rectum      EXCISION OF PERINEAL ABSCESS Diagnosis:       Perianal abscess      (Perianal abscess [K61.0])    Surgeons: Toño Portillo MD Responsible Provider: Lenin Matthew DO    Anesthesia Type: general ASA Status: 3            Anesthesia Type: general    Vitals Value Taken Time   /76 01/30/24 0837   Temp 36.1 01/30/24 0840   Pulse 49 01/30/24 0840   Resp 14 01/30/24 0840   SpO2 94 01/30/24 0840   Vitals shown include unvalidated device data.    Anesthesia Post Evaluation    Patient location during evaluation: PACU  Patient participation: waiting for patient participation  Level of consciousness: awake and alert and responsive to light touch  Pain score: 0  Pain management: adequate  Airway patency: patent  Cardiovascular status: acceptable  Respiratory status: acceptable  Hydration status: balanced  Postoperative Nausea and Vomiting: none        There were no known notable events for this encounter.

## 2024-01-30 NOTE — ANESTHESIA PROCEDURE NOTES
Airway  Date/Time: 1/30/2024 7:43 AM  Urgency: elective    Airway not difficult    Staffing  Performed: CAROLYN   Authorized by: Lenin Matthew DO    Performed by: CAROLYN Garza  Patient location during procedure: OR    Indications and Patient Condition  Indications for airway management: anesthesia  Spontaneous Ventilation: absent  Sedation level: deep  Preoxygenated: yes  Patient position: sniffing  MILS maintained throughout  Mask difficulty assessment: 0 - not attempted  Planned trial extubation    Final Airway Details  Final airway type: supraglottic airway      Successful airway: classic  Size 5     Number of attempts at approach: 1  Ventilation between attempts: supraglottic airway

## 2024-01-30 NOTE — ANESTHESIA PREPROCEDURE EVALUATION
Patient: Marlon Valenzuela    Procedure Information       Date/Time: 01/30/24 0730    Procedures:       Exam Under Anesthesia Anus/Rectum      Repair Fistula Anus    Location: [facility] OR 03 / [facility]    Surgeons: Toño Portillo MD            Relevant Problems   No relevant active problems       Clinical information reviewed:    Allergies  Meds   Med Hx  Surg Hx   Fam Hx  Soc Hx        NPO Detail:  NPO/Void Status  Date of Last Liquid: 01/30/24  Time of Last Liquid: 0500  Date of Last Solid: 01/29/24  Time of Last Solid: 2000  Time of Last Void: 0400         Physical Exam    Airway  Mallampati: III  TM distance: >3 FB     Cardiovascular   Rhythm: regular  Rate: normal     Dental - normal exam     Pulmonary - normal exam     Abdominal        Anesthesia Plan    History of general anesthesia?: yes  History of complications of general anesthesia?: no    ASA 3     general     intravenous induction   Postoperative administration of opioids is intended.  Anesthetic plan and risks discussed with patient.    Plan discussed with CAA.

## 2024-02-05 LAB
LABORATORY COMMENT REPORT: NORMAL
PATH REPORT.FINAL DX SPEC: NORMAL
PATH REPORT.GROSS SPEC: NORMAL
PATH REPORT.RELEVANT HX SPEC: NORMAL
PATH REPORT.TOTAL CANCER: NORMAL

## 2024-02-08 ENCOUNTER — OFFICE VISIT (OUTPATIENT)
Dept: SURGERY | Facility: CLINIC | Age: 54
End: 2024-02-08
Payer: MEDICARE

## 2024-02-08 VITALS
WEIGHT: 261 LBS | DIASTOLIC BLOOD PRESSURE: 87 MMHG | HEART RATE: 69 BPM | BODY MASS INDEX: 35.35 KG/M2 | SYSTOLIC BLOOD PRESSURE: 138 MMHG | TEMPERATURE: 97.3 F | HEIGHT: 72 IN

## 2024-02-08 DIAGNOSIS — K61.0 PERIANAL ABSCESS: Primary | ICD-10-CM

## 2024-02-08 PROCEDURE — 99024 POSTOP FOLLOW-UP VISIT: CPT | Performed by: NURSE PRACTITIONER

## 2024-02-08 NOTE — PROGRESS NOTES
Subjective   Patient ID: Marlon Valenzuela is a 53 y.o. male who is s/p EUA, I&D of perineal abscess on 1/30/24 by Dr. Portillo.    HPI  He has been having more drainage lately.  He has been doing hot shower instead of the sitz bath.  He keeps a pad over the area to help absorb the drainage.  No c/o any fever/chills.  He still has a lot of pain to the area.  He also has a burning pain to the skin surrounding the wound.    He went to the ED d/t chest pain and had the cardiac work-up and everything was fine.    He then developed a kidney stone but that has since passed.         Review of Systems   All other systems reviewed and are negative.      Objective   Physical Exam  Constitutional:       Appearance: Normal appearance.   HENT:      Head: Normocephalic and atraumatic.   Pulmonary:      Effort: Pulmonary effort is normal.   Genitourinary:     Comments: His perineal wound is large, but it looks good and is healing nicely.  Minimal drainage today.  He has pruritus ani to the perineum and towards the scrotum.  Pain with palpation over the wound  Musculoskeletal:         General: Normal range of motion.   Skin:     General: Skin is warm and dry.   Neurological:      General: No focal deficit present.      Mental Status: He is alert and oriented to person, place, and time.   Psychiatric:         Mood and Affect: Mood normal.         Behavior: Behavior normal.         Assessment/Plan   Marlon's perineal wound looks good today.  He will continue to do the sitz baths and keep the area clean.  He will keep gauze or a pad over the wound to help absorb the drainage.  He will call with any issues and will follow up in 2 weeks with Dr. Bandar Aguila, TASHI-CNP 02/08/24 9:29 AM

## 2024-02-13 ENCOUNTER — APPOINTMENT (OUTPATIENT)
Dept: SURGERY | Facility: CLINIC | Age: 54
End: 2024-02-13
Payer: COMMERCIAL

## 2024-02-20 ENCOUNTER — OFFICE VISIT (OUTPATIENT)
Dept: SURGERY | Facility: CLINIC | Age: 54
End: 2024-02-20
Payer: MEDICARE

## 2024-02-20 DIAGNOSIS — L02.215 PERINEAL ABSCESS: Primary | ICD-10-CM

## 2024-02-20 PROCEDURE — 99024 POSTOP FOLLOW-UP VISIT: CPT | Performed by: STUDENT IN AN ORGANIZED HEALTH CARE EDUCATION/TRAINING PROGRAM

## 2024-02-20 ASSESSMENT — ENCOUNTER SYMPTOMS
FEVER: 0
ACTIVITY CHANGE: 0
CHILLS: 0

## 2024-02-20 NOTE — PROGRESS NOTES
Subjective   Patient ID: Marlon Valenzuela is a 53 y.o. male who has a hx of HTN and hyperlipidemia who is  s/p EUA, I&D of perineal abscess on 1/30/24 .  HPI  Returns today for follow-up with wife.  States that he continues to have discomfort in the perineum at site of abscess excision.  He is wearing a panty-liner in his underwear but is not applying gauze directly to the wound.  The wound remains moist and there is occasional foul odor from the region.  No fevers, chills, sweats.      Review of Systems   Constitutional:  Negative for activity change, chills and fever.       Objective   Physical Exam  Constitutional:       General: He is not in acute distress.     Appearance: Normal appearance. He is obese. He is not ill-appearing.   HENT:      Head: Normocephalic.      Mouth/Throat:      Mouth: Mucous membranes are moist.   Eyes:      Extraocular Movements: Extraocular movements intact.   Pulmonary:      Effort: No respiratory distress.   Abdominal:      Comments: Perineal incision clean and dry, there is beefy red granulation tissue within the wound bed, no purulence.   Musculoskeletal:         General: No deformity.      Cervical back: No rigidity.   Skin:     General: Skin is warm.      Coloration: Skin is not jaundiced or pale.   Neurological:      Mental Status: He is alert.         Assessment/Plan   #Perineal abscess S/P excision 1/30/2024  -  Expected post-operative course thus far  -  Patient instructed to keep gauze directly over the wound to keep dry and facilitate continued healing  -  Patient was very unhappy about this recommendation despite education regarding its importance as its relates to wound healing and preventing infection, and recommendation to remove dressing in shower to minimize discomfort; states there is too much pulling on the wound  -  Follow-up in 3 weeks    Toño Portillo MD   2/20/2024  12:53 PM

## 2024-03-04 ASSESSMENT — ENCOUNTER SYMPTOMS
RECTAL PAIN: 1
DIAPHORESIS: 0
SHORTNESS OF BREATH: 0
UNEXPECTED WEIGHT CHANGE: 0
APPETITE CHANGE: 0
COUGH: 0
FATIGUE: 0
CONSTIPATION: 0
ANAL BLEEDING: 0
VOMITING: 0
ABDOMINAL PAIN: 0
CHILLS: 0
BLOOD IN STOOL: 0
ACTIVITY CHANGE: 0
CHEST TIGHTNESS: 0
NAUSEA: 0
DIARRHEA: 0
FEVER: 0

## 2024-03-04 NOTE — PROGRESS NOTES
HPI    Marlon Valenzuela is a 53 y.o. male who was seen by Brenda Aguila CNP 5/2023 for a recurrent perianal abscess at the base of his scrotum. He was given a course of Augmentin and MRI pelvis was obtained. He had EUA, I&D with mushroom drain placed in 2017 by [facility]. He reports that he had an I&D performed in the ED a few times over the years.     He is s/p EUA and I&D of perineal abscess on 1/30/24.     MRI pelvis 5/2023: No perianal fistula identified. Two separate narrow subcutaneous tracts of fluid along the posterior perineum, perhaps the residua of prior fluid collections, possibly draining to the skin, but neither of which appear to communicate with the perianal region.     Colonoscopy 5/2023 ([facility]):   - Preparation of the colon was fair.   - Preparation of the colon was fair.   - The examined portion of the ileum was normal.   - Stool in the rectum and in the sigmoid colon.   - The examination was otherwise normal.   - The distal rectum and anal verge are normal on retroflexion view.   - No specimens collected.   -Repeat in 5 years.     Former smoker- quit 1 month ago and smoked since he was 10/No ETOH/No Illicit drug use  PMH: HTN, COPD, HLD, pancreatitis, LIBIA  PSH: Liver biopsy   No family history of CRC or IBD  Employment: [employer]    Past Medical History:   Diagnosis Date    Acute embolism and thrombosis of unspecified vein 04/20/2015    Venous thrombosis    Other intervertebral disc degeneration, lumbar region     Bulging lumbar disc    Personal history of other diseases of the circulatory system     History of aortic aneurysm    Personal history of other diseases of the nervous system and sense organs     History of sleep apnea    Personal history of other specified conditions 04/19/2016    History of headache    Personal history of other specified conditions 04/20/2015    History of chest pain    Spondylosis, unspecified     Arthritis of back    White matter disease, unspecified     White matter  abnormality on MRI of brain       Past Surgical History:   Procedure Laterality Date    CARDIAC CATHETERIZATION  10/21/2014    Cardiac Cath Procedure Outcome:    OTHER SURGICAL HISTORY  05/11/2017    Incision Of Perianal Abscess     Current Outpatient Medications   Medication Sig Dispense Refill    aspirin-calcium carbonate 81 mg-300 mg calcium(777 mg) tablet Take 1 tablet by mouth once daily.      atenolol (Tenormin) 25 mg tablet Take 1 tablet (25 mg) by mouth once daily.      atorvastatin (Lipitor) 20 mg tablet Take 1 tablet (20 mg) by mouth once daily.      Cymbalta 30 mg DR capsule Take by mouth.      escitalopram (Lexapro) 10 mg tablet Take 1 tablet (10 mg) by mouth once daily.      esomeprazole (NexIUM) 40 mg DR capsule Take 1 capsule (40 mg) by mouth twice a day.      fenofibrate (Triglide) 160 mg tablet Take 1 tablet (160 mg) by mouth once daily.      gabapentin (Neurontin) 600 mg tablet TAKE 1 TABLET BY MOUTH IN THE MORNING  1 TABLET AT NOON  AND 2 TABLETS AT NIGHT      lisinopril 10 mg tablet Take 1 tablet (10 mg) by mouth once daily.      oxyCODONE-acetaminophen (Percocet) 5-325 mg tablet Take 1 tablet by mouth every 6 hours if needed for severe pain (7 - 10). (Patient not taking: Reported on 2/8/2024) 8 tablet 0    Trelegy Ellipta 100-62.5-25 mcg blister with device Inhale 1 puff once daily.       No current facility-administered medications for this visit.     Allergies   Allergen Reactions    Sulfa (Sulfonamide Antibiotics) GI Upset       Review of Systems   Constitutional:  Negative for activity change, appetite change, chills, diaphoresis, fatigue, fever and unexpected weight change.   Respiratory:  Negative for cough, chest tightness and shortness of breath.    Gastrointestinal:  Positive for rectal pain. Negative for abdominal pain, anal bleeding, blood in stool, constipation, diarrhea, nausea and vomiting.   All other systems reviewed and are negative.      Physical Exam  Vitals reviewed. Exam  conducted with a chaperone present.   Constitutional:       Appearance: Normal appearance.   HENT:      Head: Normocephalic.   Eyes:      Pupils: Pupils are equal, round, and reactive to light.   Cardiovascular:      Rate and Rhythm: Normal rate and regular rhythm.      Pulses: Normal pulses.      Heart sounds: Normal heart sounds.   Pulmonary:      Effort: Pulmonary effort is normal.      Breath sounds: Normal breath sounds.   Abdominal:      General: There is no distension.      Palpations: Abdomen is soft. There is no mass.      Tenderness: There is no abdominal tenderness.      Hernia: No hernia is present.   Genitourinary:     Comments: Left anterior pinhole opening without fluctuance or drainage   Musculoskeletal:         General: Normal range of motion.      Cervical back: Normal range of motion.   Skin:     General: Skin is warm and dry.      Capillary Refill: Capillary refill takes less than 2 seconds.   Neurological:      General: No focal deficit present.      Mental Status: He is alert and oriented to person, place, and time. Mental status is at baseline.   Psychiatric:         Mood and Affect: Mood normal.         Behavior: Behavior normal.         Thought Content: Thought content normal.         Judgment: Judgment normal.     Anorectal:  Small pinpoint sinus in the left anterior position without surrounding erythema/edema.  There is minimal induration but no edema.  Area is mildly TTP.  No other abnormalities visible.    Interpreted By:  JUJU BARRIOS MD  MRN: [id number]  Patient Name: LYRIC BURCIAGA     STUDY:  MRI PELVIS W/O-W CONTRAST;  5/30/2023 12:59 pm     INDICATION:  anal fistula  K60.3: Anal fistula     COMPARISON:  None.     ACCESSION NUMBER(S):  [id number]     ORDERING CLINICIAN:  JAMES KERR     TECHNIQUE:  Multiplanar MRI of the pelvis was obtained including axial, sagittal  and coronal T2 weighted SSFSE, (para)axial, (para)coronal and  sagittal T2 FSE , axial DWI, pre and post gadolinium  dynamic T1 GRE  sequences in 3 planes.  24 ml of Gadolinium contrast agent Dotarem were administered  intravenously without complication.     FINDINGS:  No perianal fistula is identified.  Within the right posterior perineum, there is a narrow tract of fluid  with hyperenhancement measuring about 11 mm in length on series 9,  image 32/54.  Within the posterior perineum extending into the medial left thigh,  there is a separate approximately 11 mm in length narrow subcutaneous  tract of fluid as annotated on series 9, image 40/54. This  demonstrates some hyperenhancement. These may empty into the skin  however neither extends to the perianal region.     The anal canal appears intact.     Distal loops of bowel do not appear dilated. No pelvic free fluid.     Urinary bladder is nondistended.     Prostate measures 4.1 cm transverse. Visualized portion of the  seminal vesicles appears symmetric.     No pelvic lymphadenopathy.     No abnormal marrow enhancement is visualized.     IMPRESSION:  No perianal fistula identified.     Two separate narrow subcutaneous tracts of fluid along the posterior  perineum, perhaps the residua of prior fluid collections, possibly  draining to the skin, but neither of which appear to communicate with  the perianal region.    Assessment and Plan:   #Perianal abscess vs subcutaneous fistula, left anterior position, no fistula on MRI exam  -  For EUA, possible fistulotomy, possible I&D abscess, possible insertion jose Barr RN   3/4/2024  1:20 PM

## 2024-03-12 ENCOUNTER — APPOINTMENT (OUTPATIENT)
Dept: SURGERY | Facility: CLINIC | Age: 54
End: 2024-03-12
Payer: MEDICARE

## 2024-09-05 ENCOUNTER — OFFICE VISIT (OUTPATIENT)
Dept: SURGERY | Facility: CLINIC | Age: 54
End: 2024-09-05
Payer: MEDICARE

## 2024-09-05 VITALS
DIASTOLIC BLOOD PRESSURE: 87 MMHG | SYSTOLIC BLOOD PRESSURE: 132 MMHG | HEART RATE: 62 BPM | HEIGHT: 72 IN | TEMPERATURE: 98.3 F | BODY MASS INDEX: 34.81 KG/M2 | WEIGHT: 257 LBS

## 2024-09-05 DIAGNOSIS — L02.215 PERINEAL ABSCESS: Primary | ICD-10-CM

## 2024-09-05 PROCEDURE — 1036F TOBACCO NON-USER: CPT | Performed by: NURSE PRACTITIONER

## 2024-09-05 PROCEDURE — 99213 OFFICE O/P EST LOW 20 MIN: CPT | Performed by: NURSE PRACTITIONER

## 2024-09-05 PROCEDURE — 3008F BODY MASS INDEX DOCD: CPT | Performed by: NURSE PRACTITIONER

## 2024-09-05 RX ORDER — ASPIRIN 81 MG/1
81 TABLET ORAL DAILY
COMMUNITY

## 2024-09-05 RX ORDER — AMOXICILLIN AND CLAVULANATE POTASSIUM 875; 125 MG/1; MG/1
1 TABLET, FILM COATED ORAL 2 TIMES DAILY
Qty: 14 TABLET | Refills: 0 | Status: SHIPPED | OUTPATIENT
Start: 2024-09-05 | End: 2024-09-12

## 2024-09-05 NOTE — PROGRESS NOTES
History Of Present Illness  Marlon Valenzuela is a 54 y.o. male who is s/p EUA, I&D of perineal abscess on 1/30/24 by Dr. Portillo.      1 1/2 week ago he noticed soreness to the anus again and then he noticed a little pink drainage with wiping.  No drainage  throughout the day.  No c/o any fever/chills.      He has no issues with his BM's and will have 1-2 soft BM's every day.  He is not sure if the abscess has recurred.    Past Medical History  He has a past medical history of Acute embolism and thrombosis of unspecified vein (04/20/2015), Other intervertebral disc degeneration, lumbar region, Personal history of other diseases of the circulatory system, Personal history of other diseases of the nervous system and sense organs, Personal history of other specified conditions (04/19/2016), Personal history of other specified conditions (04/20/2015), Spondylosis, unspecified, and White matter disease, unspecified.    Surgical History  He has a past surgical history that includes Cardiac catheterization (10/21/2014) and Other surgical history (05/11/2017).     Social History  He reports that he has quit smoking. His smoking use included cigarettes. He started smoking about 44 years ago. He has never used smokeless tobacco. He reports current alcohol use. No history on file for drug use.    Family History  No family history on file.     Allergies  Sulfa (sulfonamide antibiotics)    Review of Systems   All other systems reviewed and are negative.       Physical Exam  Constitutional:       Appearance: Normal appearance.   HENT:      Head: Normocephalic and atraumatic.   Pulmonary:      Effort: Pulmonary effort is normal.   Genitourinary:     Comments: He has a little firmness with pain in the perineum at the base of the anus in the anterior midline.  No redness or drainage to the area.  No swelling to the surrounding tissue.  Musculoskeletal:         General: Normal range of motion.   Skin:     General: Skin is warm and dry.    Neurological:      General: No focal deficit present.      Mental Status: He is alert and oriented to person, place, and time.   Psychiatric:         Mood and Affect: Mood normal.         Behavior: Behavior normal.          Last Recorded Vitals  /87   Pulse 62   Temp 36.8 °C (98.3 °F)   Wt 117 kg (257 lb)        Assessment/Plan   Marlon has a possible recurrent perineal abscess and will start taking Augmentin BID for a week.  He will do sitz baths prn.  We talked about possible surgery again if the abscess does not subside and he understands.  He will call with any issues and will follow up with Dr. Portillo in 2 weeks.       Brenda Aguila, APRN-CNP

## 2024-11-01 NOTE — PROGRESS NOTES
Patient: aMrlon Valenzuela    [id number]  : 1970 -- AGE 54 y.o.    Provider: SHERMAN Partida     Location [facility]   Service Date: 2024              [facility]  New Visit Note      HISTORY OF PRESENT ILLNESS     The patient's referring provider is: No ref. provider found    HISTORY OF PRESENT ILLNESS   Marlon Valenzuela is a 54 y.o. male who presents to a [facility] for an evaluation with concerns of New Patient Visit (He has has prior diagnosis for bronchitis/). I have independently interviewed and examined the patient in the office and reviewed available records.    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 Worked at a [employer] response exposed to benzene formaldehyde to mercury lead and asbestos He is reporting productive cough with gray phlegm throughout the day x 3 years. He c/o  Occ shortness of breath at rest when he is stressed or rainy day is worst triggered with extreme weather. He is retired as had an occupational injury . Has dyspnea with exertion . He is active but hannon not exercise He was crushed in man basket at work.  They are  have  stop for breath when walking at her own pace on level ground at about 15 minutes (mMRC 2).    CAT score is . Denies orthopnea, pnd, but has trena.  Has lost X 10 pounds in the last X 12 months, intentional. Relates chronic cough, wheezing, and occ  green denies blood streaks. Occ night cough. No hemoptysis. No fever or shivering chills. Has no runny nose, or a tingling sensation in the back of his throat. Also complains of heartburn if does not take PPI. Denies chest pain    He was given trelegy and got thrush and stopped using   Tried significant other albuterol and his symptoms helped     Previous pulmonary history:     previously was told  they have bronchitis     Inhalers/nebulized medications:     Hospitalization History: Not been hospitalized over the last year for breathing related problem.    Sleep history:  Has LIBIA - unable to tolerate mask claustraphobic     ALLERGIES AND MEDICATIONS     ALLERGIES  Allergies   Allergen Reactions    Sulfa (Sulfonamide Antibiotics) GI Upset       MEDICATIONS  Current Outpatient Medications   Medication Sig Dispense Refill    aspirin 81 mg EC tablet Take 1 tablet (81 mg) by mouth once daily.      aspirin-calcium carbonate 81 mg-300 mg calcium(777 mg) tablet Take 1 tablet by mouth once daily.      Cymbalta 30 mg DR capsule Take by mouth.      escitalopram (Lexapro) 10 mg tablet Take 1 tablet (10 mg) by mouth once daily.      fenofibrate (Triglide) 160 mg tablet Take 1 tablet (160 mg) by mouth once daily.      lisinopril 10 mg tablet Take 1 tablet (10 mg) by mouth once daily.      atenolol (Tenormin) 25 mg tablet Take 1 tablet (25 mg) by mouth once daily.      atorvastatin (Lipitor) 20 mg tablet Take 1 tablet (20 mg) by mouth once daily.      esomeprazole (NexIUM) 40 mg DR capsule Take 1 capsule (40 mg) by mouth twice a day.      gabapentin (Neurontin) 600 mg tablet TAKE 1 TABLET BY MOUTH IN THE MORNING  1 TABLET AT NOON  AND 2 TABLETS AT NIGHT      oxyCODONE-acetaminophen (Percocet) 5-325 mg tablet Take 1 tablet by mouth every 6 hours if needed for severe pain (7 - 10). (Patient not taking: Reported on 11/6/2024) 8 tablet 0    Trelegy Ellipta 100-62.5-25 mcg blister with device Inhale 1 puff once daily. (Patient not taking: Reported on 11/6/2024)       No current facility-administered medications for this visit.         PAST HISTORY     PAST MEDICAL HISTORY  He  has a past medical history of Acute embolism and thrombosis of unspecified vein (04/20/2015), Other intervertebral disc degeneration, lumbar region, Personal history of other diseases of the circulatory system, Personal history of other diseases of the  nervous system and sense organs, Personal history of other specified conditions (04/19/2016), Personal history of other specified conditions (04/20/2015), Spondylosis, unspecified, and White matter disease, unspecified.  Had 2 cardiac stents      PAST SURGICAL HISTORY  Past Surgical History:   Procedure Laterality Date    CARDIAC CATHETERIZATION  10/21/2014    Cardiac Cath Procedure Outcome:    OTHER SURGICAL HISTORY  05/11/2017    Incision Of Perianal Abscess       IMMUNIZATION HISTORY    There is no immunization history on file for this patient.    SOCIAL HISTORY  He  reports that he has quit smoking. His smoking use included cigarettes. He started smoking about 44 years ago. He has never used smokeless tobacco. He reports that he does not drink alcohol. No history on file for drug use. 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 Worked hazardous response exposed to benzene formaldehyde to mercury lead and asbestos  DOES/DOES NOT EC: does have known exposure to asbestos, silica, beryllium or inhaled metals.  DOES/DOES NOT EC: does not have exposure to birds or exotic animals. Only had dogs     FAMILY HISTORY  No family history on file.  DOES/DOES NOT EC: does have a family history of pulmonary disease.  Maternal Uncle lung cancer   DOES/DOES NOT EC: does have a family history of cancer. Mom ovarian ca   DOES/DOES NOT EC: does not have a family history of autoimmune disorders.    RESULTS/DATA     Pulmonary Function Test Results     SPIROMETRY - BASELINE AND POST DILATOR 5/10/2023    Component  Ref Range & Units 1 yr ago   FVC PRE (L)  L 4.51   FVC POST (L)  L 4.69   FEV1 PRE (L)  L 3.32   FEV1_POST (L)  L 3.46   FEV1/FVC PRE (%)  % 74   FEV1/FVC POST (%)  % 74   MMR05-01% PRE (L/S)  L/S 2.49   FLC94-41% POST (L/S)  L/S 2.76   PEF PRE (L/S)  L/S 9.35   PEF POST (L/S)  L/S 10.10    Resulting Agency [facility]   Narrative  Performed by [facility]                          [facility]                               [address] [place] [address]                                                                                     Test Date:     2023-05-10  Pat Name:      LYRIC BURCIAGA             Department:      Patient ID:    [id number]              Room:            Gender:        Male                      Technician:      :           1970                Requested By:    Order Number:  [id number]       Reading MD:    Julio Ernandez                               Interpretive Statements  Pre BD: ATS/ERS acceptability and repeatability standards for spirometry met.  Medications and Allergies were reviewed for possible drug interactions per  policy. No contraindications or sensitivities were noted. Meds taken:  /hours  before testing. 2 puffs Albuterol (180 mcg) delivered by MDI via holding chamber.  HR pre =  69 /min, HR post = 71 /min. Post BD: ATS/ERS acceptability and  repeatability standards for spirometry met.//AMD       IMPRESSION:  Spirometry is normal.  There is no significant bronchodilator response.    E    Comments:  Pre BD: ATS/ERS acceptability and repeatability standards for spirometry met.  Medications and Allergies were reviewed for possible drug interactions per  policy. No contraindications or sensitivities were noted. Meds taken:  /hours  before testing. 2 puffs Albuterol (180 mcg) delivered by MDI via holding  chamber. HR pre =  69 /min, HR post = 71 /min. Post BD: ATS/ERS acceptability  and repeatability standards for spirometry met.//AMD      Review Status:  Not Reviewed                                                        Pre    Challenge         Post                          Pred   LLN   ULN Actual %Pred Actual %Chng Actual %Chng  SPIROMETRY  FVC                      4.94  3.85  6.05   4.51    91                4.69     3  FEV1                    3.88  3.01  4.71   3.32    85                3.46     3  FEV1/FVC                0.79  0.68  0.88   0.74    93                0.74     0  FEF25                                      6.38                      7.63    19  FEF50                   4.74  2.61  6.86   3.13    66                3.47    10  FEF75                   1.18  0.52  2.54   0.95    80                1.21    27  MKN05-78                3.48  1.86  5.62   2.49    71                2.76    11  PEF L/s                 9.75  7.43 12.07   9.35    95               10.10     8  FIVC                                       4.69                      4.66     0  FIF50                                      6.85                      5.16   -24  PIF                                        6.92                      5.69   -17   Time                               9.60                     11.27    17  GERSON                                        0.14                      0.14     0  FET PEF                                    0.09                      0.08    -8    Chest Radiograph     XR chest 1 view 2024    Impression  IMPRESSION:    No active disease        Chest CT Scan      CT chest wo IV contrast 2024      IMPRESSION: Although the majority of the subcentimeter pulmonary nodules  seen on the prior examination are stable, there has been interval  development of approximately 8mm nodule medially within the left upper  lobe.    Prominent, less than 1 cm lymph nodes in the chest, likely reactive.    Stable nonspecific periportal lymphadenopathy, when compared the prior  examination.    ACTIONABLE RESULT: FOLLOW-UP  Acuity: Actionable  Findings: Thoracic-Lung nodules Routing code:  RI_1  Recommendation: CT Chest WO IVCON  Time Frame: 6-12 months  COMMUNICATION: Results will be communicated with the ordering provider  via Epic staff message or phone message by  Imaging Support Services  within 2 business days of report finalization.  -      Echocardiogram     No testing done          REVIEW OF SYSTEMS     REVIEW OF SYSTEMS  Review of Systems   Constitutional:  Positive for fatigue.   HENT:  Sneezing: breztr.    Respiratory:  Positive for cough, chest tightness, shortness of breath and wheezing.    Musculoskeletal:  Positive for arthralgias and joint swelling.   Psychiatric/Behavioral:  Positive for dysphoric mood. The patient is nervous/anxious.    All other systems reviewed and are negative.        PHYSICAL EXAM     VITAL SIGNS: /83 (BP Location: Right arm, Patient Position: Sitting, BP Cuff Size: Adult)   Pulse 67   Temp 36.4 °C (97.5 °F) (Temporal)   Resp 18   Ht 1.829 m (6')   Wt 117 kg (258 lb 12.8 oz)   SpO2 94%   BMI 35.10 kg/m²      CURRENT WEIGHT: [unfilled]  BMI: [unfilled]  PREVIOUS WEIGHTS:  Wt Readings from Last 3 Encounters:   11/06/24 117 kg (258 lb 12.8 oz)   09/05/24 117 kg (257 lb)   02/08/24 118 kg (261 lb)       Physical Exam    ASSESSMENT/PLAN     Mr. Valenzuela is a 54 y.o. male and  has a past medical history of Acute embolism and thrombosis of unspecified vein (04/20/2015), Other intervertebral disc degeneration, lumbar region, Personal history of other diseases of the circulatory system, Personal history of other diseases of the nervous system and sense organs, Personal history of other specified conditions (04/19/2016), Personal history of other specified conditions (04/20/2015), Spondylosis, unspecified, and White matter disease, unspecified. He was referred to the [facility] for evaluation of COPD and pulm nodules     Problem List and Orders      Assessment and Plan / Recommendations:  Problem List Items Addressed This Visit    None          COPD/chronic bronchitis - MMRC 2  - Obtain pulmonary function test, FENO and 6 minute walk test   - cont Trelegy stopped using due to powder, will trial breztri 2  puffs twice a day, rinse and spit afterwards, 1 month sample given to patient   ACT 13 needs CAT done at next visit   No eosinophils       Pulm nodules - patient had CT chest at [facility] on 8/30/2024 with development of an approximately 8mm nodule medially within the DASHAWN lobe - repeat in 3 months - Nov 30   - obtain films from [facility]       Tobacco abuse - 57 pack life history - current smoker   Cigarette use is harming their health, and specifically exacerbating the risk of primary lung cancer.  I have encouraged them regarding their current efforts to stop and recommended that they stop smoking entirely. I provided education and counseling regarding strategies to quit smoking.  I addressed barriers to change and suggested strategies to avoid relapse.  I recommended community support groups, and referred them to local tobacco cessation hotlines (3-602-Quit-Now).       Thank you for visiting the Pulmonary clinic today!       I personally spent 60 minutes today (exclusive of procedures) providing care for this patient, including preparation, face to face time, EMR documentation and other services such as review of medical records, diagnostic results, patient education, counseling, and coordination of care.       Return to clinic after 4weeks and after PFTs, CT scan complete  or sooner if needed   Minna Gonzalez CNP  My office number is [phone] -     Call to schedule  for radiology - CT scans/PFTs etc at  [phone]  General scheduling  [phone]     Best way to get a hold of me is to call my office --> Please do not send me follow my health messages  Any test results will be discussed at next visit -- please make sure to make a follow up appt after testing.

## 2024-11-06 ENCOUNTER — APPOINTMENT (OUTPATIENT)
Dept: PULMONOLOGY | Facility: CLINIC | Age: 54
End: 2024-11-06
Payer: MEDICARE

## 2024-11-06 VITALS
RESPIRATION RATE: 18 BRPM | DIASTOLIC BLOOD PRESSURE: 83 MMHG | WEIGHT: 258.8 LBS | SYSTOLIC BLOOD PRESSURE: 120 MMHG | TEMPERATURE: 97.5 F | OXYGEN SATURATION: 94 % | HEART RATE: 67 BPM | HEIGHT: 72 IN | BODY MASS INDEX: 35.05 KG/M2

## 2024-11-06 DIAGNOSIS — R06.02 SHORTNESS OF BREATH: ICD-10-CM

## 2024-11-06 DIAGNOSIS — J41.8 MIXED SIMPLE AND MUCOPURULENT CHRONIC BRONCHITIS (MULTI): ICD-10-CM

## 2024-11-06 DIAGNOSIS — Z72.0 TOBACCO ABUSE DISORDER: Primary | ICD-10-CM

## 2024-11-06 PROCEDURE — 3008F BODY MASS INDEX DOCD: CPT | Performed by: NURSE PRACTITIONER

## 2024-11-06 PROCEDURE — 1036F TOBACCO NON-USER: CPT | Performed by: NURSE PRACTITIONER

## 2024-11-06 PROCEDURE — 99205 OFFICE O/P NEW HI 60 MIN: CPT | Performed by: NURSE PRACTITIONER

## 2024-11-06 RX ORDER — ALBUTEROL SULFATE 90 UG/1
2 INHALANT RESPIRATORY (INHALATION) EVERY 4 HOURS PRN
Qty: 18 G | Refills: 5 | Status: SHIPPED | OUTPATIENT
Start: 2024-11-06 | End: 2025-11-06

## 2024-11-06 ASSESSMENT — ASTHMA QUESTIONNAIRES
QUESTION_2 LAST FOUR WEEKS HOW OFTEN HAVE YOU HAD SHORTNESS OF BREATH: 1 OR 2 TIMES PER WEEK
QUESTION_1 LAST FOUR WEEKS HOW MUCH OF THE TIME DID YOUR ASTHMA KEEP YOU FROM GETTING AS MUCH DONE AT WORK, SCHOOL OR AT HOME: ALL OF THE TIME
QUESTION_4 LAST FOUR WEEKS HOW OFTEN HAVE YOU USED YOUR RESCUE INHALER OR NEBULIZER MEDICATION (SUCH AS ALBUTEROL): NOT AT ALL
QUESTION_5 LAST FOUR WEEKS HOW WOULD YOU RATE YOUR ASTHMA CONTROL: NOT CONTROLLED AT ALL
ACT_TOTALSCORE: 13
QUESTION_3 LAST FOUR WEEKS HOW OFTEN DID YOUR ASTHMA SYMPTOMS (WHEEZING, COUGHING, SHORTNESS OF BREATH, CHEST TIGHTNESS OR PAIN) WAKE YOU UP AT NIGHT OR EARLIER THAN USUAL IN THE MORNING: 2-3 NIGHTS A WEEK

## 2024-11-06 ASSESSMENT — ENCOUNTER SYMPTOMS
WHEEZING: 1
CHEST TIGHTNESS: 1
FATIGUE: 1
DYSPHORIC MOOD: 1
JOINT SWELLING: 1
ARTHRALGIAS: 1
NERVOUS/ANXIOUS: 1
COUGH: 1
SHORTNESS OF BREATH: 1

## 2024-11-06 ASSESSMENT — PAIN SCALES - GENERAL: PAINLEVEL_OUTOF10: 0-NO PAIN

## 2024-11-06 NOTE — PATIENT INSTRUCTIONS
COPD - MMRC 2  - Obtain pulmonary function test, FENO and 6 minute walk test   - cont Trelegy stopped using due to powder, will trial breztri 2 piuffs twice a day, rinse and spit afterwards   ACT 13   No eosinophils       Pulm nodules - [facility] development of an approximately 8mm nodule medially within the left upper   lobe - repeat in 3 months - Nov 30   - obtain films from [facility]       Tobacco abuse - 57 pack life history - current smoker   Cigarette use is harming their health, and specifically exacerbating the risk of primary lung cancer.  I have encouraged them regarding their current efforts to stop and recommended that they stop smoking entirely. I provided education and counseling regarding strategies to quit smoking.  I addressed barriers to change and suggested strategies to avoid relapse.  I recommended community support groups, and referred them to local tobacco cessation hotlines (6-459-Quit-Now).       Thank you for visiting the Pulmonary clinic today!       Return to clinic after 4weeks and after PFTs, CT scan complete  or sooner if needed   Minna Gonzalez CNP  My office number is [phone] -     Call to schedule  for radiology - CT scans/PFTs etc at  [phone]  General scheduling  [phone]     Best way to get a hold of me is to call my office --> Please do not send me follow my health messages  Any test results will be discussed at next visit -- please make sure to make a follow up appt after testing.

## 2024-12-04 ASSESSMENT — ENCOUNTER SYMPTOMS
SHORTNESS OF BREATH: 1
NERVOUS/ANXIOUS: 1
DYSPHORIC MOOD: 1
COUGH: 1
WHEEZING: 1
CHEST TIGHTNESS: 1
FATIGUE: 1
ARTHRALGIAS: 1
JOINT SWELLING: 1

## 2024-12-04 NOTE — PROGRESS NOTES
Patient: Marlon Valenzuela    [id number]  : 1970 -- AGE 54 y.o.    Provider: SHERMAN Partida     Location [facility]   Service Date: 2024              [facility]  New Visit Note      HISTORY OF PRESENT ILLNESS     The patient's referring provider is: No ref. provider found    HISTORY OF PRESENT ILLNESS   Marlon Valenzuela is a 54 y.o. male who presents to a [facility] for an evaluation with concerns of Follow-up, Shortness of Breath, Fatigue, and Sleep Apnea. I have independently interviewed and examined the patient in the office and reviewed available records.    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 He c/o  Occ shortness of breath at rest when he is stressed or rainy day is worst triggered with extreme weather. He is retired as had an occupational injury . Has dyspnea with exertion . He is active but hannon not exercise He was crushed in man basket at work.  They are  have  stop for breath when walking at her own pace on level ground at about 15 minutes (mMRC 2).    CAT score is . Denies orthopnea, pnd, but has trena.  Has lost X 10 pounds in the last X 12 months, intentional. Relates chronic cough, wheezing, and occ  green denies blood streaks. Occ night cough. No hemoptysis. No fever or shivering chills. Has no runny nose, or a tingling sensation in the back of his throat. Also complains of heartburn if does not take PPI. Denies chest pain    He was given trelegy and got thrush and stopped using   Tried significant other albuterol and his symptoms helped     Previous pulmonary history:     previously was  told they have bronchitis     Inhalers/nebulized medications:     Hospitalization History: Not been hospitalized over the last year for breathing related problem.    Sleep history:  Has LIBIA - unable to tolerate mask claustraphobic       Current History 12/16/2024    On today's visit, the patient reports his cough is a lot less.. Still has productive cough with phlegm.  He reports short of breath with short distance and after a couple minutes.   Occ wheezing  Denies Fevers/chills  Denies SOB at rest chest pain   Denies runny nose or throat clearing   No ED visits  Denies Night time symptoms   Using the rescue inhaler - 1-2 times a day  On Breztri rinsing after use       ALLERGIES AND MEDICATIONS     ALLERGIES  Allergies   Allergen Reactions    Sulfa (Sulfonamide Antibiotics) GI Upset       MEDICATIONS  Current Outpatient Medications   Medication Sig Dispense Refill    albuterol (Ventolin HFA) 90 mcg/actuation inhaler Inhale 2 puffs every 4 hours if needed for wheezing or shortness of breath. 18 g 5    aspirin 81 mg EC tablet Take 1 tablet (81 mg) by mouth once daily.      aspirin-calcium carbonate 81 mg-300 mg calcium(777 mg) tablet Take 1 tablet by mouth once daily.      atenolol (Tenormin) 25 mg tablet Take 1 tablet (25 mg) by mouth once daily.      budesonide-glycopyr-formoterol (BREZTRI) 160-9-4.8 mcg/actuation HFA aerosol inhaler Inhale 2 puffs 2 times a day. 10.7 g 3    Cymbalta 30 mg DR capsule Take by mouth.      escitalopram (Lexapro) 10 mg tablet Take 1 tablet (10 mg) by mouth once daily.      fenofibrate (Triglide) 160 mg tablet Take 1 tablet (160 mg) by mouth once daily.      lisinopril 10 mg tablet Take 1 tablet (10 mg) by mouth once daily.      oxyCODONE-acetaminophen (Percocet) 5-325 mg tablet Take 1 tablet by mouth every 6 hours if needed for severe pain (7 - 10). 8 tablet 0    Trelegy Ellipta 100-62.5-25 mcg blister with device Inhale 1 puff once daily.      atorvastatin (Lipitor) 20 mg tablet Take 1  tablet (20 mg) by mouth once daily.      esomeprazole (NexIUM) 40 mg DR capsule Take 1 capsule (40 mg) by mouth twice a day.      gabapentin (Neurontin) 600 mg tablet TAKE 1 TABLET BY MOUTH IN THE MORNING  1 TABLET AT NOON  AND 2 TABLETS AT NIGHT       No current facility-administered medications for this visit.         PAST HISTORY     PAST MEDICAL HISTORY  He  has a past medical history of Acute embolism and thrombosis of unspecified vein (04/20/2015), Other intervertebral disc degeneration, lumbar region, Personal history of other diseases of the circulatory system, Personal history of other diseases of the nervous system and sense organs, Personal history of other specified conditions (04/19/2016), Personal history of other specified conditions (04/20/2015), Spondylosis, unspecified, and White matter disease, unspecified.  Had 2 cardiac stents      PAST SURGICAL HISTORY  Past Surgical History:   Procedure Laterality Date    CARDIAC CATHETERIZATION  10/21/2014    Cardiac Cath Procedure Outcome:    OTHER SURGICAL HISTORY  05/11/2017    Incision Of Perianal Abscess       IMMUNIZATION HISTORY    There is no immunization history on file for this patient.    SOCIAL HISTORY  He  reports that he has quit smoking. His smoking use included cigarettes. He started smoking about 44 years ago. He has never used smokeless tobacco. He reports that he does not drink alcohol. No history on file for drug use. 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 Worked hazardous response exposed to benzene formaldehyde to mercury lead and asbestos  DOES/DOES NOT EC: does have known exposure to asbestos, silica, beryllium or inhaled metals.  DOES/DOES NOT EC: does not have exposure to birds or exotic animals. Only had dogs     FAMILY HISTORY  No family history on file.  DOES/DOES NOT EC: does have a  family history of pulmonary disease.  Maternal Uncle lung cancer   DOES/DOES NOT EC: does have a family history of cancer. Mom ovarian ca   DOES/DOES NOT EC: does not have a family history of autoimmune disorders.    RESULTS/DATA     Pulmonary Function Test Results        Complete Pulmonary Function Test Pre/Post Bronchodialator (Spirometry Pre/Post/DLCO/Lung Volumes) 12/12/2024  Status: Final result     Study Result    Narrative & Impression   The FEV1/FVC (0.76) is normal. The FEV1 (3.20L/82%) is normal. The FVC is normal. Following administration of bronchodilators, there is no significant response. The TLC by body plethysmography is normal. The DLCO (98%) is normal; however, the diffusing capacity was not corrected for the patient's hemoglobin. The airways resistance is normal. FeNO was 13 ppb   Conclusion: Normal spirometry. Lung volumes are consistent with hyperinflation. The DLCO is normal. FeNO was normal.     Scans on Order [id number]      Pulmonary Function Test - Scan on 12/12/2024  5:10 PM                              Chest Radiograph     XR chest 1 view 02/07/2024    Impression  IMPRESSION:    No active disease        Chest CT Scan      CT chest wo IV contrast 8/30/2024      IMPRESSION: Although the majority of the subcentimeter pulmonary nodules  seen on the prior examination are stable, there has been interval  development of approximately 8mm nodule medially within the left upper  lobe.    Prominent, less than 1 cm lymph nodes in the chest, likely reactive.    Stable nonspecific periportal lymphadenopathy, when compared the prior  examination.    ACTIONABLE RESULT: FOLLOW-UP  Acuity: Actionable  Findings: Thoracic-Lung nodules Routing code:  RI_1  Recommendation: CT Chest WO IVCON  Time Frame: 6-12 months  COMMUNICATION: Results will be communicated with the ordering provider  via Epic staff message or phone message by [facility]  within 2 business days of report  finalization.  -      Echocardiogram     No testing done          REVIEW OF SYSTEMS     REVIEW OF SYSTEMS  Review of Systems   Constitutional:  Positive for fatigue.   HENT:  Sneezing: breztr.    Respiratory:  Positive for cough, chest tightness, shortness of breath and wheezing.    Musculoskeletal:  Positive for arthralgias and joint swelling.   Psychiatric/Behavioral:  Positive for dysphoric mood. The patient is nervous/anxious.    All other systems reviewed and are negative.        PHYSICAL EXAM     VITAL SIGNS: /73   Pulse 68   Temp 36.8 °C (98.2 °F)   Resp 22   Ht 1.829 m (6')   Wt 119 kg (262 lb 3.2 oz)   SpO2 95%   BMI 35.56 kg/m²      CURRENT WEIGHT: [unfilled]  BMI: [unfilled]  PREVIOUS WEIGHTS:  Wt Readings from Last 3 Encounters:   12/16/24 119 kg (262 lb 3.2 oz)   11/06/24 117 kg (258 lb 12.8 oz)   09/05/24 117 kg (257 lb)       Physical Exam  Constitutional:       Appearance: Normal appearance.   HENT:      Head: Normocephalic and atraumatic.      Right Ear: External ear normal.      Left Ear: External ear normal.      Nose: Nose normal.      Mouth/Throat:      Mouth: Mucous membranes are moist.      Pharynx: Oropharynx is clear.   Eyes:      Extraocular Movements: Extraocular movements intact.      Conjunctiva/sclera: Conjunctivae normal.      Pupils: Pupils are equal, round, and reactive to light.   Cardiovascular:      Rate and Rhythm: Normal rate and regular rhythm.      Pulses: Normal pulses.      Heart sounds: Normal heart sounds.   Pulmonary:      Effort: Pulmonary effort is normal.      Breath sounds: Normal breath sounds.   Abdominal:      General: Bowel sounds are normal.      Palpations: Abdomen is soft.   Musculoskeletal:         General: Normal range of motion.      Cervical back: Normal range of motion and neck supple.   Skin:     General: Skin is warm and dry.   Neurological:      General: No focal deficit present.      Mental Status: He is alert and oriented to person, place, and  time. Mental status is at baseline.   Psychiatric:         Mood and Affect: Mood normal.         Behavior: Behavior normal.         Thought Content: Thought content normal.         Judgment: Judgment normal.         ASSESSMENT/PLAN     Mr. Valenzuela is a 54 y.o. male and  has a past medical history of Acute embolism and thrombosis of unspecified vein (04/20/2015), Other intervertebral disc degeneration, lumbar region, Personal history of other diseases of the circulatory system, Personal history of other diseases of the nervous system and sense organs, Personal history of other specified conditions (04/19/2016), Personal history of other specified conditions (04/20/2015), Spondylosis, unspecified, and White matter disease, unspecified. He was referred to the [facility] for evaluation of COPD and pulm nodules     Problem List and Orders      Assessment and Plan / Recommendations:  Problem List Items Addressed This Visit    None          COPD/chronic bronchitis - MMRC 2  - pulmonary function test,Conclusion: Normal spirometry. Lung volumes are consistent with hyperinflation. The DLCO is normal. FeNO was normal.   6 minute walk test no desaturation   - improved  breztri 2 puffs twice a day, rinse and spit afterwards, 1 month sample given to patient   ACT 13 needs CAT done at next visit   No eosinophils       Pulm nodules - patient had [facility] chest [facility] on [facility]/30/2024 with development of an approximately 8mm nodule medially within the DASHAWN lobe - repeat in 3 months - Nov 30   - CT chest 12/2024 - No evidence of 8 mm left upper lobe nodule features of mild smoking related airway disease. Coronary calcification.  - Repeat CT chest in 12/2025      Tobacco abuse - 57 pack life history - current smoker   Cigarette use is harming their health, and specifically exacerbating the risk of primary lung cancer.  I have encouraged them regarding their current efforts to stop and recommended that they stop  smoking entirely. I provided education and counseling regarding strategies to quit smoking.  I addressed barriers to change and suggested strategies to avoid relapse.  I recommended community support groups, and referred them to local tobacco cessation hotlines (1-800-Quit-Now).   - now tapered to 1/2 ppd     CV: dyspnea  Reemmend F/U with Cardiology - consider echo    Sleep apnea - refer to sleep     Thank you for visiting the Pulmonary clinic today!       I personally spent 60 minutes today (exclusive of procedures) providing care for this patient, including preparation, face to face time, EMR documentation and other services such as review of medical records, diagnostic results, patient education, counseling, and coordination of care.       Return to clinic after 8-12 weeks and a or sooner if needed   Minna Gonzalez CNP  My office number is [phone] -     Call to schedule  for radiology - CT scans/PFTs etc at  [phone]  General scheduling  [phone]     Best way to get a hold of me is to call my office --> Please do not send me follow my health messages  Any test results will be discussed at next visit -- please make sure to make a follow up appt after testing.

## 2024-12-10 ENCOUNTER — HOSPITAL ENCOUNTER (OUTPATIENT)
Dept: RADIOLOGY | Facility: CLINIC | Age: 54
Discharge: HOME | End: 2024-12-10
Payer: MEDICARE

## 2024-12-10 DIAGNOSIS — R06.02 SHORTNESS OF BREATH: ICD-10-CM

## 2024-12-10 DIAGNOSIS — Z72.0 TOBACCO ABUSE DISORDER: ICD-10-CM

## 2024-12-10 DIAGNOSIS — J41.8 MIXED SIMPLE AND MUCOPURULENT CHRONIC BRONCHITIS (MULTI): ICD-10-CM

## 2024-12-10 PROCEDURE — 71250 CT THORAX DX C-: CPT

## 2024-12-10 PROCEDURE — 71250 CT THORAX DX C-: CPT | Performed by: RADIOLOGY

## 2024-12-12 ENCOUNTER — HOSPITAL ENCOUNTER (OUTPATIENT)
Dept: RESPIRATORY THERAPY | Facility: HOSPITAL | Age: 54
Discharge: HOME | End: 2024-12-12
Payer: MEDICARE

## 2024-12-12 DIAGNOSIS — J41.8 MIXED SIMPLE AND MUCOPURULENT CHRONIC BRONCHITIS (MULTI): ICD-10-CM

## 2024-12-12 DIAGNOSIS — R06.02 SHORTNESS OF BREATH: ICD-10-CM

## 2024-12-12 DIAGNOSIS — Z72.0 TOBACCO ABUSE DISORDER: ICD-10-CM

## 2024-12-12 LAB
MGC ASCENT PFT - FEV1 - POST: 3.46
MGC ASCENT PFT - FEV1 - POST: 3.46
MGC ASCENT PFT - FEV1 - PRE: 3.2
MGC ASCENT PFT - FEV1 - PRE: 3.2
MGC ASCENT PFT - FEV1 - PREDICTED: 3.87
MGC ASCENT PFT - FEV1 - PREDICTED: 3.87
MGC ASCENT PFT - FVC - POST: 4.6
MGC ASCENT PFT - FVC - POST: 4.6
MGC ASCENT PFT - FVC - PRE: 4.24
MGC ASCENT PFT - FVC - PRE: 4.24
MGC ASCENT PFT - FVC - PREDICTED: 4.92
MGC ASCENT PFT - FVC - PREDICTED: 4.92

## 2024-12-12 PROCEDURE — 94618 PULMONARY STRESS TESTING: CPT

## 2024-12-12 PROCEDURE — 94726 PLETHYSMOGRAPHY LUNG VOLUMES: CPT

## 2024-12-12 PROCEDURE — 95012 NITRIC OXIDE EXP GAS DETER: CPT

## 2024-12-16 ENCOUNTER — APPOINTMENT (OUTPATIENT)
Dept: PULMONOLOGY | Facility: CLINIC | Age: 54
End: 2024-12-16
Payer: MEDICARE

## 2024-12-16 VITALS
TEMPERATURE: 98.2 F | RESPIRATION RATE: 22 BRPM | HEIGHT: 72 IN | OXYGEN SATURATION: 95 % | BODY MASS INDEX: 35.51 KG/M2 | WEIGHT: 262.2 LBS | HEART RATE: 68 BPM | DIASTOLIC BLOOD PRESSURE: 73 MMHG | SYSTOLIC BLOOD PRESSURE: 143 MMHG

## 2024-12-16 DIAGNOSIS — R06.02 SHORTNESS OF BREATH: ICD-10-CM

## 2024-12-16 DIAGNOSIS — F17.218 NICOTINE DEPENDENCE, CIGARETTES, WITH OTHER NICOTINE-INDUCED DISORDERS: ICD-10-CM

## 2024-12-16 DIAGNOSIS — Z72.0 TOBACCO ABUSE DISORDER: Primary | ICD-10-CM

## 2024-12-16 DIAGNOSIS — G47.33 OSA (OBSTRUCTIVE SLEEP APNEA): ICD-10-CM

## 2024-12-16 DIAGNOSIS — J41.8 MIXED SIMPLE AND MUCOPURULENT CHRONIC BRONCHITIS (MULTI): ICD-10-CM

## 2024-12-16 PROCEDURE — 3008F BODY MASS INDEX DOCD: CPT | Performed by: NURSE PRACTITIONER

## 2024-12-16 PROCEDURE — 99214 OFFICE O/P EST MOD 30 MIN: CPT | Performed by: NURSE PRACTITIONER

## 2024-12-16 PROCEDURE — 1036F TOBACCO NON-USER: CPT | Performed by: NURSE PRACTITIONER

## 2024-12-16 ASSESSMENT — ENCOUNTER SYMPTOMS
LOSS OF SENSATION IN FEET: 0
OCCASIONAL FEELINGS OF UNSTEADINESS: 0
DEPRESSION: 0

## 2024-12-16 ASSESSMENT — COLUMBIA-SUICIDE SEVERITY RATING SCALE - C-SSRS
2. HAVE YOU ACTUALLY HAD ANY THOUGHTS OF KILLING YOURSELF?: NO
1. IN THE PAST MONTH, HAVE YOU WISHED YOU WERE DEAD OR WISHED YOU COULD GO TO SLEEP AND NOT WAKE UP?: NO
6. HAVE YOU EVER DONE ANYTHING, STARTED TO DO ANYTHING, OR PREPARED TO DO ANYTHING TO END YOUR LIFE?: NO

## 2024-12-16 ASSESSMENT — PATIENT HEALTH QUESTIONNAIRE - PHQ9
1. LITTLE INTEREST OR PLEASURE IN DOING THINGS: NOT AT ALL
SUM OF ALL RESPONSES TO PHQ9 QUESTIONS 1 AND 2: 0
2. FEELING DOWN, DEPRESSED OR HOPELESS: NOT AT ALL

## 2024-12-16 NOTE — PATIENT INSTRUCTIONS
COPD/chronic bronchitis - MMRC 2  - pulmonary function test,Conclusion: Normal spirometry. Lung volumes are consistent with hyperinflation. The DLCO is normal. FeNO was normal.   6 minute walk test no desaturation   - improved  breztri 2 puffs twice a day, rinse and spit afterwards, 1 month sample given to patient   ACT 13 needs CAT done at next visit   No eosinophils       Pulm nodules - patient had CT chest at [facility] on 8/30/2024 with development of an approximately 8mm nodule medially within the DASHAWN lobe - repeat in 3 months - Nov 30   - CT chest 12/2024 - No evidence of 8 mm left upper lobe nodule features of mild smoking related airway disease. Coronary calcification.  - Repeat CT chest in 12/2025      Tobacco abuse - 57 pack life history - current smoker   Cigarette use is harming their health, and specifically exacerbating the risk of primary lung cancer.  I have encouraged them regarding their current efforts to stop and recommended that they stop smoking entirely. I provided education and counseling regarding strategies to quit smoking.  I addressed barriers to change and suggested strategies to avoid relapse.  I recommended community support groups, and referred them to local tobacco cessation hotlines (1-800-Quit-Now).   - now tapered to 1/2 ppd     CV: dyspnea  Reemmend F/U with Cardiology - consider echo    Sleep apnea - refer to sleep     Thank you for visiting the Pulmonary clinic today!       I personally spent 60 minutes today (exclusive of procedures) providing care for this patient, including preparation, face to face time, EMR documentation and other services such as review of medical records, diagnostic results, patient education, counseling, and coordination of care.       Return to clinic after 8-12 weeks and a or sooner if needed   Minna Gonzalez CNP  My office number is [phone] -     Call to schedule  for radiology - CT scans/PFTs etc at  [phone]  General scheduling   [phone]     Best way to get a hold of me is to call my office --> Please do not send me follow my health messages  Any test results will be discussed at next visit -- please make sure to make a follow up appt after testing.

## 2025-02-12 ENCOUNTER — APPOINTMENT (OUTPATIENT)
Facility: CLINIC | Age: 55
End: 2025-02-12
Payer: COMMERCIAL

## 2025-03-27 ENCOUNTER — TELEPHONE (OUTPATIENT)
Facility: CLINIC | Age: 55
End: 2025-03-27

## 2025-03-27 ENCOUNTER — APPOINTMENT (OUTPATIENT)
Facility: CLINIC | Age: 55
End: 2025-03-27
Payer: COMMERCIAL

## 2025-03-27 NOTE — TELEPHONE ENCOUNTER
Copied from CRM #[id number]. Topic: Appointment Cancelled  >> Mar 27, 2025  9:41 AM Janell GOLDBERG wrote:  Appointment Cancelled for Patient.-Same day JORGE LUIS Reyna Has the flu

## 2025-03-31 ASSESSMENT — ENCOUNTER SYMPTOMS
COUGH: 1
JOINT SWELLING: 1
WHEEZING: 1
DYSPHORIC MOOD: 1
FATIGUE: 1
ARTHRALGIAS: 1
SHORTNESS OF BREATH: 1

## 2025-03-31 NOTE — PROGRESS NOTES
Patient: Marlon Valenzuela    [id number]  : 1970 -- AGE 54 y.o.    Provider: SHERMAN Partida     Location [facility]   Service Date: 4/3/2025              [facility]  New Visit Note      HISTORY OF PRESENT ILLNESS     The patient's referring provider is: No ref. provider found    HISTORY OF PRESENT ILLNESS   Marlon Valenzuela is a 54 y.o. male who presents to a [facility] for an evaluation with concerns of Follow-up. I have independently interviewed and examined the patient in the office and reviewed available records.    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 Worked at a [employer] response exposed to benzene formaldehyde to mercury lead and asbestos He is reporting productive cough with gray phlegm throughout the day x 3 years. He c/o  Occ shortness of breath at rest when he is stressed or rainy day is worst triggered with extreme weather. He is retired as had an occupational injury . Has dyspnea with exertion . He is active but hannon not exercise He was crushed in man basket at work.  They are  have  stop for breath when walking at her own pace on level ground at about 15 minutes (mMRC 2).    CAT score is . Denies orthopnea, pnd, but has trena.  Has lost X 10 pounds in the last X 12 months, intentional. Relates chronic cough, wheezing, and occ  green denies blood streaks. Occ night cough. No hemoptysis. No fever or shivering chills. Has no runny nose, or a tingling sensation in the back of his throat. Also complains of heartburn if does not take PPI. Denies chest pain    He was given trelegy and got thrush and stopped using   Tried significant other albuterol and his symptoms helped     Previous pulmonary history:     previously was told they have bronchitis     Inhalers/nebulized  medications:     Hospitalization History: Not been hospitalized over the last year for breathing related problem.    Sleep history:  Has LIBIA - unable to tolerate mask claustraphobic       Current History 12/16/2024    On today's visit, the patient reports his cough is a lot less.. Still has productive cough with phlegm.  He reports short of breath with short distance and after a couple minutes.   Occ wheezing  Denies Fevers/chills  Denies SOB at rest chest pain   Denies runny nose or throat clearing   No ED visits  Denies Night time symptoms   Using the rescue inhaler - 1-2 times a day  On Breztri rinsing after use     4/3/2025      On today's visit, the patient reports he was hospitalized for back pain and immobility. Reports he has arthritis. Ambulates.   Occ cough with grayish secretions. Occ wheezing. Denies SOB at rest. He is dyspnea with strenuous activity.   Denies Fevers/chills or chest pain  Denies Night time cough   Smoking - 3/4 to  1/2 ppd per day   Using rescue inhaler intermittently 1-2 times a week   He is able to clear secretions   CAT 20     ALLERGIES AND MEDICATIONS     ALLERGIES  Allergies   Allergen Reactions    Sulfa (Sulfonamide Antibiotics) GI Upset       MEDICATIONS  Current Outpatient Medications   Medication Sig Dispense Refill    albuterol (Ventolin HFA) 90 mcg/actuation inhaler Inhale 2 puffs every 4 hours if needed for wheezing or shortness of breath. 18 g 5    aspirin 81 mg EC tablet Take 1 tablet (81 mg) by mouth once daily.      aspirin-calcium carbonate 81 mg-300 mg calcium(777 mg) tablet Take 1 tablet by mouth once daily.      atenolol (Tenormin) 25 mg tablet Take 1 tablet (25 mg) by mouth once daily.      budesonide-glycopyr-formoterol (BREZTRI) 160-9-4.8 mcg/actuation HFA aerosol inhaler Inhale 2 puffs 2 times a day. 10.7 g 3    Cymbalta 30 mg DR capsule Take by mouth.      escitalopram (Lexapro) 10 mg tablet Take 1 tablet (10 mg) by mouth once daily.      fenofibrate (Triglide)  160 mg tablet Take 1 tablet (160 mg) by mouth once daily.      lisinopril 10 mg tablet Take 1 tablet (10 mg) by mouth once daily.      oxyCODONE-acetaminophen (Percocet) 5-325 mg tablet Take 1 tablet by mouth every 6 hours if needed for severe pain (7 - 10). 8 tablet 0    Trelegy Ellipta 100-62.5-25 mcg blister with device Inhale 1 puff once daily.      atorvastatin (Lipitor) 20 mg tablet Take 1 tablet (20 mg) by mouth once daily.      esomeprazole (NexIUM) 40 mg DR capsule Take 1 capsule (40 mg) by mouth twice a day.      gabapentin (Neurontin) 600 mg tablet TAKE 1 TABLET BY MOUTH IN THE MORNING  1 TABLET AT NOON  AND 2 TABLETS AT NIGHT       No current facility-administered medications for this visit.         PAST HISTORY     PAST MEDICAL HISTORY  He  has a past medical history of Acute embolism and thrombosis of unspecified vein (04/20/2015), Other intervertebral disc degeneration, lumbar region, Personal history of other diseases of the circulatory system, Personal history of other diseases of the nervous system and sense organs, Personal history of other specified conditions (04/19/2016), Personal history of other specified conditions (04/20/2015), Spondylosis, unspecified, and White matter disease, unspecified.  Had 2 cardiac stents      PAST SURGICAL HISTORY  Past Surgical History:   Procedure Laterality Date    CARDIAC CATHETERIZATION  10/21/2014    Cardiac Cath Procedure Outcome:    OTHER SURGICAL HISTORY  05/11/2017    Incision Of Perianal Abscess       IMMUNIZATION HISTORY    There is no immunization history on file for this patient.    SOCIAL HISTORY  He  reports that he has quit smoking. His smoking use included cigarettes. He started smoking about 45 years ago. He has never used smokeless tobacco. He reports that he does not drink alcohol. No history on file for drug use. 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 Worked hazardous response exposed to benzene formaldehyde to mercury lead and asbestos  DOES/DOES NOT EC: does have known exposure to asbestos, silica, beryllium or inhaled metals.  DOES/DOES NOT EC: does not have exposure to birds or exotic animals. Only had dogs     FAMILY HISTORY  No family history on file.  DOES/DOES NOT EC: does have a family history of pulmonary disease.  Maternal Uncle lung cancer   DOES/DOES NOT EC: does have a family history of cancer. Mom ovarian ca   DOES/DOES NOT EC: does not have a family history of autoimmune disorders.    RESULTS/DATA     Pulmonary Function Test Results        Complete Pulmonary Function Test Pre/Post Bronchodialator (Spirometry Pre/Post/DLCO/Lung Volumes) 12/12/2024  Status: Final result     Study Result    Narrative & Impression   The FEV1/FVC (0.76) is normal. The FEV1 (3.20L/82%) is normal. The FVC is normal. Following administration of bronchodilators, there is no significant response. The TLC by body plethysmography is normal. The DLCO (98%) is normal; however, the diffusing capacity was not corrected for the patient's hemoglobin. The airways resistance is normal. FeNO was 13 ppb   Conclusion: Normal spirometry. Lung volumes are consistent with hyperinflation. The DLCO is normal. FeNO was normal.     Scans on Order [id number]      Pulmonary Function Test - Scan on 12/12/2024  5:10 PM                              Chest Radiograph     XR chest 1 view 02/07/2024    Impression  IMPRESSION:    No active disease        Chest CT Scan     CT CHEST WO IV CONTRAST;  12/10/2024 8:52 am      INDICATION:  Signs/Symptoms:pulm nodules 8 mm DASHAWN.      COMPARISON:  08/30/2024 report 08/11/2022 CT imaging      ACCESSION NUMBER(S):  [id number]      ORDERING CLINICIAN:  CAMRYN GIPSON      TECHNIQUE:  Helical data acquisition of the chest was obtained without  intravenous contrast. Images were reformatted in axial, coronal,  and  sagittal planes.      FINDINGS:  Findings of smoking related airway disease. Moderate coronary  calcification. Aneurysmal dilatation of the ascending aorta up to 4.8  cm. No adenopathy seen in the mediastinum.      Small sliding hiatal hernia.      Hepatic steatosis. Unremarkable adrenal glands.      Smoking related airway changes detected with bronchial thickening. No  pneumothorax. No pleural effusions      4 mm stable right lower lobe nodule on image 125. Stable 5 mm nodule  in the right middle lobe on image 142. Reported 8 mm nodule seen in  the left upper lobe not well appreciated.      IMPRESSION:  1.  No evidence of 8 mm left upper lobe nodule features of mild  smoking related airway disease. Coronary calcification.  2. Ascending aortic aneurysm up to 4.8 cm.  3. There are couple of other stable pulmonary nodules. No new or  growing nodules.  4. Follow-up advised in 12 months per Fleischner criteria given  smoking related airway findings.          Signed by: Nguyễn Luis 12/11/2024 5:26 PM  Dictation workstation:   [id number]       Echocardiogram     No testing done          REVIEW OF SYSTEMS     REVIEW OF SYSTEMS  Review of Systems   Constitutional:  Positive for fatigue.   HENT:  Sneezing: breztr.    Respiratory:  Positive for cough, shortness of breath and wheezing. Negative for chest tightness.    Musculoskeletal:  Positive for arthralgias and joint swelling.   Psychiatric/Behavioral:  Positive for dysphoric mood.    All other systems reviewed and are negative.        PHYSICAL EXAM     VITAL SIGNS: /88   Pulse 65   Temp 36.3 °C (97.3 °F)   Resp 18   Ht 1.829 m (6')   Wt 121 kg (266 lb 6.4 oz)   SpO2 95%   BMI 36.13 kg/m²      CURRENT WEIGHT: [unfilled]  BMI: [unfilled]  PREVIOUS WEIGHTS:  Wt Readings from Last 3 Encounters:   04/03/25 121 kg (266 lb 6.4 oz)   12/16/24 119 kg (262 lb 3.2 oz)   11/06/24 117 kg (258 lb 12.8 oz)       Physical Exam  Constitutional:       Appearance: Normal  appearance.   HENT:      Head: Normocephalic and atraumatic.      Right Ear: External ear normal.      Left Ear: External ear normal.      Nose: Nose normal.      Mouth/Throat:      Mouth: Mucous membranes are moist.      Pharynx: Oropharynx is clear.   Eyes:      Extraocular Movements: Extraocular movements intact.      Conjunctiva/sclera: Conjunctivae normal.      Pupils: Pupils are equal, round, and reactive to light.   Cardiovascular:      Rate and Rhythm: Normal rate and regular rhythm.      Pulses: Normal pulses.      Heart sounds: Normal heart sounds.   Pulmonary:      Effort: Pulmonary effort is normal.      Breath sounds: Wheezing present.      Comments: Upper lobes exp wheezing   Abdominal:      General: Bowel sounds are normal.      Palpations: Abdomen is soft.   Musculoskeletal:         General: Normal range of motion.      Cervical back: Normal range of motion and neck supple.   Skin:     General: Skin is warm and dry.   Neurological:      General: No focal deficit present.      Mental Status: He is alert and oriented to person, place, and time. Mental status is at baseline.   Psychiatric:         Mood and Affect: Mood normal.         Behavior: Behavior normal.         Thought Content: Thought content normal.         Judgment: Judgment normal.         ASSESSMENT/PLAN     Mr. Valenzuela is a 54 y.o. male and  has a past medical history of Acute embolism and thrombosis of unspecified vein (04/20/2015), Other intervertebral disc degeneration, lumbar region, Personal history of other diseases of the circulatory system, Personal history of other diseases of the nervous system and sense organs, Personal history of other specified conditions (04/19/2016), Personal history of other specified conditions (04/20/2015), Spondylosis, unspecified, and White matter disease, unspecified. He was referred to the [facility] for evaluation of COPD and pulm nodules     Problem List and  Orders      Assessment and Plan / Recommendations:  Problem List Items Addressed This Visit    None          COPD/chronic bronchitis - MMRC 2  - pulmonary function test: Normal spirometry. Lung volumes are consistent with hyperinflation. The DLCO is normal. FeNO was normal.   6 minute walk test no desaturation   CAT 20    No eosinophils       Pulm nodules - patient had CT chest at [facility] on 8/30/2024 with development of an approximately 8mm nodule medially within the DASHAWN lobe - repeat in 3 months - Nov 30   - CT chest 12/2024 - No evidence of 8 mm left upper lobe nodule features of mild smoking related airway disease. Coronary calcification.  - Repeat CT chest in 12/2025 - 4 mm stable right lower lobe nodule on image 125. Stable 5 mm nodule  in the right middle lobe on image 142. Reported 8 mm nodule seen in the left upper lobe not well appreciated  Repeat CT chest in 12 months       Tobacco abuse - 57 pack life history - current smoker   Cigarette use is harming their health, and specifically exacerbating the risk of primary lung cancer.  I have encouraged them regarding their current efforts to stop and recommended that they stop smoking entirely. I provided education and counseling regarding strategies to quit smoking.  I addressed barriers to change and suggested strategies to avoid relapse.  I recommended community support groups, and referred them to local tobacco cessation hotlines (2-800-Quit-Now).   - now tapered to 1/2 ppd     CV: dyspnea  Reemmend F/U with Cardiology - consider echo    Sleep apnea - refer to sleep       Return to clinic after12 weeks and a or sooner if needed   Minna Gonzalez CNP  My office number is [phone] -     Call to schedule  for radiology - CT scans/PFTs etc at  [phone]  General scheduling  [phone]     Best way to get a hold of me is to call my office --> Please do not send me follow my health messages  Any test results will be discussed at next visit -- please  make sure to make a follow up appt after testing.

## 2025-04-03 ENCOUNTER — APPOINTMENT (OUTPATIENT)
Facility: CLINIC | Age: 55
End: 2025-04-03
Payer: COMMERCIAL

## 2025-04-03 VITALS
HEIGHT: 72 IN | SYSTOLIC BLOOD PRESSURE: 132 MMHG | HEART RATE: 65 BPM | DIASTOLIC BLOOD PRESSURE: 88 MMHG | WEIGHT: 266.4 LBS | BODY MASS INDEX: 36.08 KG/M2 | TEMPERATURE: 97.3 F | RESPIRATION RATE: 18 BRPM | OXYGEN SATURATION: 95 %

## 2025-04-03 DIAGNOSIS — J41.8 MIXED SIMPLE AND MUCOPURULENT CHRONIC BRONCHITIS (MULTI): Primary | ICD-10-CM

## 2025-04-03 PROCEDURE — 99214 OFFICE O/P EST MOD 30 MIN: CPT | Performed by: NURSE PRACTITIONER

## 2025-04-03 PROCEDURE — 3008F BODY MASS INDEX DOCD: CPT | Performed by: NURSE PRACTITIONER

## 2025-04-03 PROCEDURE — 1036F TOBACCO NON-USER: CPT | Performed by: NURSE PRACTITIONER

## 2025-04-03 ASSESSMENT — COPD QUESTIONNAIRES
QUESTION3_CHESTTIGHTNESS: 2
QUESTION1_COUGHFREQUENCY: 3
CAT_TOTALSCORE: 20
QUESTION2_CHESTPHLEGM: 3
QUESTION8_ENERGYLEVEL: 2
QUESTION4_WALKINCLINE: 3
QUESTION5_HOMEACTIVITIES: 3
QUESTION6_LEAVINGHOUSE: 1
QUESTION7_SLEEPQUALITY: 3

## 2025-04-03 ASSESSMENT — COLUMBIA-SUICIDE SEVERITY RATING SCALE - C-SSRS
1. IN THE PAST MONTH, HAVE YOU WISHED YOU WERE DEAD OR WISHED YOU COULD GO TO SLEEP AND NOT WAKE UP?: NO
2. HAVE YOU ACTUALLY HAD ANY THOUGHTS OF KILLING YOURSELF?: NO
6. HAVE YOU EVER DONE ANYTHING, STARTED TO DO ANYTHING, OR PREPARED TO DO ANYTHING TO END YOUR LIFE?: NO

## 2025-04-03 ASSESSMENT — PATIENT HEALTH QUESTIONNAIRE - PHQ9
SUM OF ALL RESPONSES TO PHQ9 QUESTIONS 1 AND 2: 0
2. FEELING DOWN, DEPRESSED OR HOPELESS: NOT AT ALL
1. LITTLE INTEREST OR PLEASURE IN DOING THINGS: NOT AT ALL

## 2025-04-03 ASSESSMENT — ENCOUNTER SYMPTOMS: CHEST TIGHTNESS: 0

## 2025-04-03 NOTE — PATIENT INSTRUCTIONS
COPD/chronic bronchitis - MMRC 2  - pulmonary function test: Normal spirometry. Lung volumes are consistent with hyperinflation. The DLCO is normal. FeNO was normal.   6 minute walk test no desaturation   CAT 20    No eosinophils       Pulm nodules - patient had CT chest at [facility] on 8/30/2024 with development of an approximately 8mm nodule medially within the DASHAWN lobe - repeat in 3 months - Nov 30   - CT chest 12/2024 - No evidence of 8 mm left upper lobe nodule features of mild smoking related airway disease. Coronary calcification.  - Repeat CT chest in 12/2025 - 4 mm stable right lower lobe nodule on image 125. Stable 5 mm nodule  in the right middle lobe on image 142. Reported 8 mm nodule seen in the left upper lobe not well appreciated  Repeat CT chest in 12 months       Tobacco abuse - 57 pack life history - current smoker   Cigarette use is harming their health, and specifically exacerbating the risk of primary lung cancer.  I have encouraged them regarding their current efforts to stop and recommended that they stop smoking entirely. I provided education and counseling regarding strategies to quit smoking.  I addressed barriers to change and suggested strategies to avoid relapse.  I recommended community support groups, and referred them to local tobacco cessation hotlines (5-240-Quit-Now).   - now tapered to 1/2 ppd     CV: dyspnea  Reemmend F/U with Cardiology - consider echo    Sleep apnea - refer to sleep       Return to clinic after12 weeks and a or sooner if needed   Minna Gonzalez CNP  My office number is [phone] -     Call to schedule  for radiology - CT scans/PFTs etc at  [phone]  General scheduling  [phone]     Best way to get a hold of me is to call my office --> Please do not send me follow my health messages  Any test results will be discussed at next visit -- please make sure to make a follow up appt after testing.

## 2025-04-24 ENCOUNTER — HOSPITAL ENCOUNTER (OUTPATIENT)
Dept: RADIOLOGY | Facility: HOSPITAL | Age: 55
Discharge: HOME | End: 2025-04-24
Payer: MEDICARE

## 2025-04-24 DIAGNOSIS — I71.21 ANEURYSM OF THE ASCENDING AORTA, WITHOUT RUPTURE: ICD-10-CM

## 2025-04-24 PROCEDURE — 71275 CT ANGIOGRAPHY CHEST: CPT

## 2025-04-24 PROCEDURE — 2550000001 HC RX 255 CONTRASTS

## 2025-04-24 RX ADMIN — IOHEXOL 90 ML: 350 INJECTION, SOLUTION INTRAVENOUS at 11:04

## 2025-04-29 PROBLEM — F44.89 CONFUSIONAL STATE: Status: RESOLVED | Noted: 2025-04-29 | Resolved: 2025-04-29

## 2025-04-29 PROBLEM — B34.9 VIRAL SYNDROME: Status: RESOLVED | Noted: 2021-01-23 | Resolved: 2025-04-29

## 2025-04-29 PROBLEM — H93.19 TINNITUS: Status: RESOLVED | Noted: 2023-10-03 | Resolved: 2025-04-29

## 2025-04-29 PROBLEM — F17.200 NICOTINE DEPENDENCE: Status: RESOLVED | Noted: 2019-03-26 | Resolved: 2025-04-29

## 2025-04-29 PROBLEM — R79.89 ELEVATED D-DIMER: Status: RESOLVED | Noted: 2023-02-06 | Resolved: 2025-04-29

## 2025-04-29 PROBLEM — I25.10 ARTERIOSCLEROSIS OF CORONARY ARTERY: Status: ACTIVE | Noted: 2025-04-29

## 2025-04-29 PROBLEM — M47.816 LUMBAR SPONDYLOSIS: Status: RESOLVED | Noted: 2025-04-29 | Resolved: 2025-04-29

## 2025-04-29 PROBLEM — Z86.79 HISTORY OF AORTIC ANEURYSM: Status: ACTIVE | Noted: 2025-04-29

## 2025-04-29 PROBLEM — G47.30 SLEEP APNEA: Status: RESOLVED | Noted: 2025-04-29 | Resolved: 2025-04-29

## 2025-04-29 PROBLEM — R90.89 MAGNETIC RESONANCE IMAGING OF BRAIN ABNORMAL: Status: RESOLVED | Noted: 2025-04-29 | Resolved: 2025-04-29

## 2025-04-29 PROBLEM — G47.19 EXCESSIVE DAYTIME SLEEPINESS: Status: RESOLVED | Noted: 2025-04-29 | Resolved: 2025-04-29

## 2025-04-29 PROBLEM — J44.9 CHRONIC OBSTRUCTIVE PULMONARY DISEASE (MULTI): Status: ACTIVE | Noted: 2023-10-05

## 2025-04-29 PROBLEM — R40.1 CLOUDED CONSCIOUSNESS: Status: RESOLVED | Noted: 2025-04-29 | Resolved: 2025-04-29

## 2025-04-29 PROBLEM — M79.7 FIBROMYOSITIS: Status: RESOLVED | Noted: 2019-11-11 | Resolved: 2025-04-29

## 2025-04-29 PROBLEM — I82.90 VENOUS THROMBOSIS: Status: RESOLVED | Noted: 2025-04-29 | Resolved: 2025-04-29

## 2025-04-29 PROBLEM — R25.1 TREMOR: Status: RESOLVED | Noted: 2025-04-29 | Resolved: 2025-04-29

## 2025-04-29 PROBLEM — R07.9 CHEST PAIN: Status: RESOLVED | Noted: 2023-02-06 | Resolved: 2025-04-29

## 2025-04-29 PROBLEM — R40.4 STARING EPISODES: Status: RESOLVED | Noted: 2025-04-29 | Resolved: 2025-04-29

## 2025-04-29 PROBLEM — S93.402A LEFT ANKLE SPRAIN: Status: RESOLVED | Noted: 2025-04-29 | Resolved: 2025-04-29

## 2025-04-29 PROBLEM — Z11.52 ENCOUNTER FOR SCREENING FOR COVID-19: Status: RESOLVED | Noted: 2021-01-23 | Resolved: 2025-04-29

## 2025-04-29 PROBLEM — K52.9 ACUTE GASTROENTERITIS: Status: RESOLVED | Noted: 2025-04-29 | Resolved: 2025-04-29

## 2025-04-29 PROBLEM — Z91.148 NONCOMPLIANCE WITH MEDICATION REGIMEN: Status: RESOLVED | Noted: 2025-04-29 | Resolved: 2025-04-29

## 2025-04-29 PROBLEM — E66.812 OBESITY, CLASS II, BMI 35-39.9: Status: RESOLVED | Noted: 2023-05-05 | Resolved: 2025-04-29

## 2025-04-29 PROBLEM — E66.9 OBESITY (BMI 30-39.9): Status: RESOLVED | Noted: 2023-05-04 | Resolved: 2025-04-29

## 2025-04-29 PROBLEM — F32.A DEPRESSIVE DISORDER: Status: RESOLVED | Noted: 2025-04-29 | Resolved: 2025-04-29

## 2025-04-29 PROBLEM — G89.29 CHRONIC PAIN: Status: RESOLVED | Noted: 2025-04-29 | Resolved: 2025-04-29

## 2025-04-29 PROBLEM — S82.892A FRACTURE OF LEFT ANKLE: Status: RESOLVED | Noted: 2025-04-29 | Resolved: 2025-04-29

## 2025-04-29 PROBLEM — H90.3 ASYMMETRICAL SENSORINEURAL HEARING LOSS: Status: RESOLVED | Noted: 2023-10-03 | Resolved: 2025-04-29

## 2025-04-29 PROBLEM — K60.30 ANAL FISTULA: Status: RESOLVED | Noted: 2023-05-30 | Resolved: 2025-04-29

## 2025-04-29 PROBLEM — F41.9 ANXIETY: Status: RESOLVED | Noted: 2025-04-29 | Resolved: 2025-04-29

## 2025-04-29 PROBLEM — M54.50 LOWER BACK PAIN: Status: RESOLVED | Noted: 2025-04-29 | Resolved: 2025-04-29

## 2025-04-29 PROBLEM — D72.829 LEUKOCYTOSIS: Status: RESOLVED | Noted: 2023-05-04 | Resolved: 2025-04-29
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
## 2025-04-29 PROBLEM — J01.90 ACUTE SINUSITIS: Status: RESOLVED | Noted: 2025-04-29 | Resolved: 2025-04-29

## 2025-04-29 PROBLEM — I71.21 THORACIC ASCENDING AORTIC ANEURYSM: Status: ACTIVE | Noted: 2025-04-29

## 2025-04-29 PROBLEM — H91.92 DEAFNESS, LEFT: Status: RESOLVED | Noted: 2023-10-03 | Resolved: 2025-04-29

## 2025-04-29 PROBLEM — K22.10 ESOPHAGEAL ULCER: Status: RESOLVED | Noted: 2025-04-29 | Resolved: 2025-04-29

## 2025-04-29 PROBLEM — J20.9 ACUTE BRONCHITIS: Status: RESOLVED | Noted: 2025-04-29 | Resolved: 2025-04-29

## 2025-04-29 PROBLEM — L50.9 URTICARIA: Status: RESOLVED | Noted: 2025-04-29 | Resolved: 2025-04-29

## 2025-04-29 PROBLEM — M54.12 CERVICAL RADICULOPATHY: Status: RESOLVED | Noted: 2025-04-29 | Resolved: 2025-04-29

## 2025-04-29 PROBLEM — G89.4 CHRONIC PAIN SYNDROME: Status: RESOLVED | Noted: 2018-10-23 | Resolved: 2025-04-29

## 2025-04-29 PROBLEM — R10.13 EPIGASTRIC PAIN: Status: RESOLVED | Noted: 2025-04-29 | Resolved: 2025-04-29

## 2025-04-29 PROBLEM — R10.9 ABDOMINAL PAIN: Status: RESOLVED | Noted: 2025-04-29 | Resolved: 2025-04-29

## 2025-04-29 PROBLEM — K76.0 HEPATIC STEATOSIS: Status: RESOLVED | Noted: 2023-05-04 | Resolved: 2025-04-29

## 2025-04-29 PROBLEM — K76.82 HEPATIC ENCEPHALOPATHY: Status: RESOLVED | Noted: 2025-04-29 | Resolved: 2025-04-29

## 2025-04-29 PROBLEM — K85.90 ACUTE PANCREATITIS: Status: RESOLVED | Noted: 2023-05-04 | Resolved: 2025-04-29

## 2025-04-29 PROBLEM — F33.41 RECURRENT MAJOR DEPRESSIVE DISORDER, IN PARTIAL REMISSION (CMS-HCC): Status: RESOLVED | Noted: 2023-10-05 | Resolved: 2025-04-29

## 2025-04-29 PROBLEM — G47.33 OBSTRUCTIVE SLEEP APNEA: Status: ACTIVE | Noted: 2025-04-29

## 2025-04-29 PROBLEM — E78.00 PURE HYPERCHOLESTEROLEMIA: Status: RESOLVED | Noted: 2019-11-11 | Resolved: 2025-04-29

## 2025-04-29 PROBLEM — R41.3 LOM (LOSS OF MEMORY): Status: RESOLVED | Noted: 2025-04-29 | Resolved: 2025-04-29

## 2025-04-29 PROBLEM — R40.0 DAYTIME SOMNOLENCE: Status: RESOLVED | Noted: 2025-04-29 | Resolved: 2025-04-29

## 2025-04-29 RX ORDER — BISMUTH SUBSALICYLATE 262 MG/1
524 TABLET ORAL 4 TIMES DAILY
COMMUNITY
Start: 2023-10-05

## 2025-04-29 RX ORDER — IBUPROFEN 600 MG/1
600 TABLET ORAL EVERY 12 HOURS PRN
COMMUNITY
Start: 2025-03-24 | End: 2025-05-23

## 2025-04-29 RX ORDER — ALPRAZOLAM 0.25 MG/1
0.25 TABLET ORAL DAILY PRN
COMMUNITY
Start: 2025-04-10 | End: 2025-05-10

## 2025-04-29 RX ORDER — OMEPRAZOLE 40 MG/1
40 CAPSULE, DELAYED RELEASE ORAL 2 TIMES DAILY
COMMUNITY

## 2025-04-29 RX ORDER — ASPIRIN 325 MG
50000 TABLET, DELAYED RELEASE (ENTERIC COATED) ORAL WEEKLY
COMMUNITY
Start: 2025-03-24 | End: 2025-06-22

## 2025-04-29 RX ORDER — ICOSAPENT ETHYL 1 G/1
2 CAPSULE ORAL
COMMUNITY
Start: 2025-04-08 | End: 2026-01-03

## 2025-04-29 RX ORDER — TOPIRAMATE 25 MG/1
25 TABLET ORAL 2 TIMES DAILY
COMMUNITY

## 2025-04-29 RX ORDER — BLOOD PRESSURE TEST KIT-WRIST
400 KIT MISCELLANEOUS AS NEEDED
COMMUNITY
Start: 2025-03-24 | End: 2025-06-22

## 2025-04-29 RX ORDER — CYCLOBENZAPRINE HCL 10 MG
10 TABLET ORAL EVERY 8 HOURS PRN
COMMUNITY
Start: 2025-03-24 | End: 2025-05-23

## 2025-04-29 NOTE — PROGRESS NOTES
Referral from Dr. Mancilla. Marlon comes to discuss treatment recommendations for dilated ascending aorta 4.3x4.1. Additional history of hld, copd, depression, [place], h/o pancreatitis. Nancy Coello RN    This is a 54 years old male patient first time in our clinic.  He was sent to me because he was found incidentally with a 4.5 cm ascending aorta.  I have personally reviewed the CT scan and I did some anatomical analysis and there is max diameter from sinus to commissure of 4.6 cm so that means the root is mildly dilated.  The widest diameter at the mid ascending aorta is also 4.5 cm.  He is a rather large body habitus so I do not think this is extremely dilated.  He has no significant family history and he does not have any body traits with any connective tissue disorder.  He has been diagnosed with hypertension and he is taking his medication.  We talked about prevention avoiding heavy lifting, blood pressure management and surveillance.  The patient is somewhat anxious about this new diagnosis and I reassured that he will be fine as long as we continue to follow and also we have good blood pressure control.  We are going obtain echocardiogram was done last month based on the patient's story.  We did not find the echo report or images in our system.  We did try to recover those images or were going to get a new echocardiogram to make sure that aortic valve is working fine.  On the CT scan I can tell this valve is trileaflet.

## 2025-04-30 ENCOUNTER — OFFICE VISIT (OUTPATIENT)
Dept: CARDIAC SURGERY | Facility: HOSPITAL | Age: 55
End: 2025-04-30
Payer: COMMERCIAL

## 2025-04-30 VITALS
DIASTOLIC BLOOD PRESSURE: 78 MMHG | BODY MASS INDEX: 35.08 KG/M2 | HEIGHT: 72 IN | OXYGEN SATURATION: 96 % | SYSTOLIC BLOOD PRESSURE: 119 MMHG | HEART RATE: 52 BPM | WEIGHT: 259 LBS

## 2025-04-30 DIAGNOSIS — I77.810 DILATATION OF THORACIC AORTA: ICD-10-CM

## 2025-04-30 DIAGNOSIS — I77.810 DILATED AORTIC ROOT (CMS-HCC): ICD-10-CM

## 2025-04-30 PROCEDURE — 1036F TOBACCO NON-USER: CPT | Performed by: THORACIC SURGERY (CARDIOTHORACIC VASCULAR SURGERY)

## 2025-04-30 PROCEDURE — 3074F SYST BP LT 130 MM HG: CPT | Performed by: THORACIC SURGERY (CARDIOTHORACIC VASCULAR SURGERY)

## 2025-04-30 PROCEDURE — 99215 OFFICE O/P EST HI 40 MIN: CPT | Performed by: THORACIC SURGERY (CARDIOTHORACIC VASCULAR SURGERY)

## 2025-04-30 PROCEDURE — 3008F BODY MASS INDEX DOCD: CPT | Performed by: THORACIC SURGERY (CARDIOTHORACIC VASCULAR SURGERY)

## 2025-04-30 PROCEDURE — 3078F DIAST BP <80 MM HG: CPT | Performed by: THORACIC SURGERY (CARDIOTHORACIC VASCULAR SURGERY)

## 2025-04-30 PROCEDURE — 99205 OFFICE O/P NEW HI 60 MIN: CPT | Performed by: THORACIC SURGERY (CARDIOTHORACIC VASCULAR SURGERY)

## 2025-04-30 ASSESSMENT — PAIN SCALES - GENERAL: PAINLEVEL_OUTOF10: 4

## 2025-05-12 DIAGNOSIS — I77.810 DILATATION OF THORACIC AORTA: ICD-10-CM

## 2025-05-15 ENCOUNTER — HOSPITAL ENCOUNTER (OUTPATIENT)
Dept: CARDIOLOGY | Facility: CLINIC | Age: 55
Discharge: HOME | End: 2025-05-15
Payer: COMMERCIAL

## 2025-05-15 DIAGNOSIS — I77.810 DILATATION OF THORACIC AORTA: ICD-10-CM

## 2025-05-15 PROCEDURE — 93306 TTE W/DOPPLER COMPLETE: CPT | Performed by: INTERNAL MEDICINE

## 2025-05-15 PROCEDURE — 93306 TTE W/DOPPLER COMPLETE: CPT

## 2025-05-16 LAB
AORTIC VALVE PEAK VELOCITY: 1.33 M/S
AV PEAK GRADIENT: 7 MMHG
AVA (PEAK VEL): 3.02 CM2
EJECTION FRACTION APICAL 4 CHAMBER: 57.6
EJECTION FRACTION: 63 %
LEFT ATRIUM VOLUME AREA LENGTH INDEX BSA: 26.7 ML/M2
LEFT VENTRICLE INTERNAL DIMENSION DIASTOLE: 5.44 CM (ref 3.5–6)
LEFT VENTRICULAR OUTFLOW TRACT DIAMETER: 2.07 CM
MITRAL VALVE E/A RATIO: 0.67
RIGHT VENTRICLE FREE WALL PEAK S': 16 CM/S
TRICUSPID ANNULAR PLANE SYSTOLIC EXCURSION: 2.1 CM

## 2025-05-20 PROBLEM — Q21.12 PFO (PATENT FORAMEN OVALE) (HHS-HCC): Status: ACTIVE | Noted: 2025-05-20

## 2025-05-20 NOTE — PROGRESS NOTES
Contacting Marlon to discuss recommendations for PFO treatment seen on recent echo. Nancy Coello RN     This is a 54 years old male patient that I already saw in clinic before because of a dilated ascending aorta.  At that point we requested an echocardiogram which was done and shows an aortic valve which is trileaflet with no insufficiency or stenosis however as an incidental finding it was described a large PFO [more than 20 bubbles].  The patient has no significant symptoms related to this whatsoever.  So the plan would be sending back to his primary physician Dr. Car Mancilla for final discussion.  I recommended to follow his primary doctor recommendation and from our perspective we will keep on 1 more year follow-up with CT scan.  Today we had a phone conversation of about 10 minutes.

## 2025-05-21 ENCOUNTER — TELEMEDICINE (OUTPATIENT)
Dept: CARDIAC SURGERY | Facility: HOSPITAL | Age: 55
End: 2025-05-21
Payer: COMMERCIAL

## 2025-05-21 DIAGNOSIS — I79.0 ANEURYSM OF AORTA IN DISEASES CLASSIFIED ELSEWHERE: Primary | ICD-10-CM

## 2025-05-21 PROCEDURE — 99213 OFFICE O/P EST LOW 20 MIN: CPT | Performed by: THORACIC SURGERY (CARDIOTHORACIC VASCULAR SURGERY)

## 2025-05-22 DIAGNOSIS — I77.819 DILATATION OF AORTA: ICD-10-CM

## 2025-05-30 ENCOUNTER — APPOINTMENT (OUTPATIENT)
Dept: CARDIOLOGY | Facility: CLINIC | Age: 55
End: 2025-05-30
Payer: COMMERCIAL

## 2025-07-07 ENCOUNTER — APPOINTMENT (OUTPATIENT)
Facility: CLINIC | Age: 55
End: 2025-07-07
Payer: COMMERCIAL

## 2025-07-07 ASSESSMENT — ENCOUNTER SYMPTOMS
DYSPHORIC MOOD: 1
SHORTNESS OF BREATH: 1
COUGH: 1
ARTHRALGIAS: 1
JOINT SWELLING: 1
FATIGUE: 1
CHEST TIGHTNESS: 0
WHEEZING: 1

## 2025-07-07 NOTE — PROGRESS NOTES
Patient: Marlon Valenzuela    [id number]  : 1970 -- AGE 55 y.o.    Provider: Anastasiya Boyd CMA     Location [facility]   Service Date: 2025              [facility]  New Visit Note      HISTORY OF PRESENT ILLNESS     The patient's referring provider is: No ref. provider found    HISTORY OF PRESENT ILLNESS   Marlon Valenzuela is a 55 y.o. male who presents to a [facility] for an evaluation with concerns of No chief complaint on file.. I have independently interviewed and examined the patient in the office and reviewed available records.    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 Worked at a [employer] response exposed to benzene formaldehyde to mercury lead and asbestos He is reporting productive cough with gray phlegm throughout the day x 3 years. He c/o  Occ shortness of breath at rest when he is stressed or rainy day is worst triggered with extreme weather. He is retired as had an occupational injury . Has dyspnea with exertion . He is active but hannon not exercise He was crushed in man basket at work.  They are  have  stop for breath when walking at her own pace on level ground at about 15 minutes (mMRC 2).    CAT score is . Denies orthopnea, pnd, but has trena.  Has lost X 10 pounds in the last X 12 months, intentional. Relates chronic cough, wheezing, and occ  green denies blood streaks. Occ night cough. No hemoptysis. No fever or shivering chills. Has no runny nose, or a tingling sensation in the back of his throat. Also complains of heartburn if does not take PPI. Denies chest pain    He was given trelegy and got thrush and stopped using   Tried significant other albuterol and his symptoms helped     Previous pulmonary history:     previously was told they have bronchitis      Inhalers/nebulized medications:     Hospitalization History: Not been hospitalized over the last year for breathing related problem.    Sleep history:  Has LIBIA - unable to tolerate mask claustraphobic       Current History 12/16/2024    On today's visit, the patient reports his cough is a lot less.. Still has productive cough with phlegm.  He reports short of breath with short distance and after a couple minutes.   Occ wheezing  Denies Fevers/chills  Denies SOB at rest chest pain   Denies runny nose or throat clearing   No ED visits  Denies Night time symptoms   Using the rescue inhaler - 1-2 times a day  On Breztri rinsing after use     4/3/2025     On today's visit, the patient reports he was hospitalized for back pain and immobility. Reports he has arthritis. Ambulates.   Occ cough with grayish secretions. Occ wheezing. Denies SOB at rest. He is dyspnea with strenuous activity.   Denies Fevers/chills or chest pain  Denies Night time cough   Smoking - 3/4 to  1/2 ppd per day   Using rescue inhaler intermittently 1-2 times a week   He is able to clear secretions   CAT 20     7/7/2025    On today's visit, the patient reports ***      Since last visit  OLDCART  cough  wheeze  Fevers/chills  RAMIREZ  SOB at rest  chest pain  allergies  GERD  ED visits  Up to date on vaccines  Night time  Day time       ALLERGIES AND MEDICATIONS     ALLERGIES  Allergies   Allergen Reactions    Sulfa (Sulfonamide Antibiotics) GI Upset       MEDICATIONS  Current Outpatient Medications   Medication Sig Dispense Refill    albuterol (Ventolin HFA) 90 mcg/actuation inhaler Inhale 2 puffs every 4 hours if needed for wheezing or shortness of breath. 18 g 5    ALPRAZolam (Xanax) 0.25 mg tablet Take 1 tablet (0.25 mg) by mouth once daily as needed.      aspirin 81 mg EC tablet Take 1 tablet (81 mg) by mouth once daily.      aspirin-calcium carbonate 81 mg-300 mg calcium(777 mg) tablet Take 1 tablet by mouth once daily.      atenolol (Tenormin)  25 mg tablet Take 1 tablet (25 mg) by mouth once daily.      atorvastatin (Lipitor) 20 mg tablet Take 1 tablet (20 mg) by mouth once daily.      bismuth subsalicylate (Pepto Bismol) 262 mg chewable tablet Chew 2 tablets (524 mg) 4 times a day.      budesonide-glycopyr-formoterol (BREZTRI) 160-9-4.8 mcg/actuation HFA aerosol inhaler Inhale 2 puffs 2 times a day. 10.7 g 3    cyclobenzaprine (Flexeril) 10 mg tablet Take 1 tablet (10 mg) by mouth every 8 hours if needed.      Cymbalta 30 mg DR capsule Take by mouth.      escitalopram (Lexapro) 10 mg tablet Take 1 tablet (10 mg) by mouth once daily.      esomeprazole (NexIUM) 40 mg DR capsule Take 1 capsule (40 mg) by mouth twice a day.      fenofibrate (Triglide) 160 mg tablet Take 1 tablet (160 mg) by mouth once daily.      gabapentin (Neurontin) 600 mg tablet TAKE 1 TABLET BY MOUTH IN THE MORNING  1 TABLET AT NOON  AND 2 TABLETS AT NIGHT      icosapent ethyL (Vascepa) 1 gram capsule Take 2 capsules (2 g) by mouth 2 times daily (morning and late afternoon).      lisinopril 10 mg tablet Take 1 tablet (10 mg) by mouth once daily.      omeprazole (PriLOSEC) 40 mg DR capsule Take 1 capsule (40 mg) by mouth 2 times a day.      oxyCODONE-acetaminophen (Percocet) 5-325 mg tablet Take 1 tablet by mouth every 6 hours if needed for severe pain (7 - 10). 8 tablet 0    topiramate (Topamax) 25 mg tablet Take 1 tablet (25 mg) by mouth 2 times a day.      Trelegy Ellipta 100-62.5-25 mcg blister with device Inhale 1 puff once daily.       No current facility-administered medications for this visit.         PAST HISTORY     PAST MEDICAL HISTORY  He  has a past medical history of Abdominal pain (04/29/2025), Acute bronchitis (04/29/2025), Acute embolism and thrombosis of unspecified vein (04/20/2015), Acute gastroenteritis (04/29/2025), Acute pancreatitis (05/04/2023), Acute sinusitis (04/29/2025), Anal fistula (05/30/2023), Anxiety (04/29/2025), Asymmetrical sensorineural hearing loss  (10/03/2023), Cervical radiculopathy (04/29/2025), Chest pain (02/06/2023), Chronic pain (04/29/2025), Chronic pain syndrome (10/23/2018), Clouded consciousness (04/29/2025), Confusional state (04/29/2025), Contusion of upper limb (11/25/2013), Daytime somnolence (04/29/2025), Depression (10/11/2014), Depressive disorder (04/29/2025), Elevated d-dimer (02/06/2023), Encounter for screening for COVID-19 (01/23/2021), Epigastric pain (04/29/2025), Esophageal ulcer (04/29/2025), Excessive daytime sleepiness (04/29/2025), Fall (04/29/2025), Fibromyositis (11/11/2019), Fracture of left ankle (04/29/2025), Headache (07/01/2016), Hepatic steatosis (05/04/2023), Left ankle sprain (04/29/2025), Leukocytosis (05/04/2023), LOM (loss of memory) (04/29/2025), Lower back pain (04/29/2025), Lumbar spondylosis (04/29/2025), Magnetic resonance imaging of brain abnormal (04/29/2025), Nicotine dependence (03/26/2019), Noncompliance with medication regimen (04/29/2025), Obesity (BMI 30-39.9) (05/04/2023), Obesity, Class II, BMI 35-39.9 (05/05/2023), Other intervertebral disc degeneration, lumbar region, Personal history of other diseases of the circulatory system, Personal history of other diseases of the nervous system and sense organs, Personal history of other specified conditions (04/19/2016), Personal history of other specified conditions (04/20/2015), Pure hypercholesterolemia (11/11/2019), Recurrent major depressive disorder, in partial remission (10/05/2023), Sleep apnea (04/29/2025), Spondylosis, unspecified, Tinnitus (10/03/2023), Tremor (04/29/2025), Tremor (04/29/2025), Urticaria (04/29/2025), Venous thrombosis (04/29/2025), Viral syndrome (01/23/2021), and White matter disease, unspecified.  Had 2 cardiac stents      PAST SURGICAL HISTORY  Past Surgical History:   Procedure Laterality Date    CARDIAC CATHETERIZATION  10/21/2014    Cardiac Cath Procedure Outcome:    OTHER SURGICAL HISTORY  05/11/2017    Incision Of Perianal  Abscess       IMMUNIZATION HISTORY    There is no immunization history on file for this patient.    SOCIAL HISTORY  He  reports that he has quit smoking. His smoking use included cigarettes. He started smoking about 45 years ago. He has never used smokeless tobacco. He reports that he does not drink alcohol. No history on file for drug use. 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 Worked hazardous response exposed to benzene formaldehyde to mercury lead and asbestos  DOES/DOES NOT EC: does have known exposure to asbestos, silica, beryllium or inhaled metals.  DOES/DOES NOT EC: does not have exposure to birds or exotic animals. Only had dogs     FAMILY HISTORY  No family history on file.  DOES/DOES NOT EC: does have a family history of pulmonary disease.  Maternal Uncle lung cancer   DOES/DOES NOT EC: does have a family history of cancer. Mom ovarian ca   DOES/DOES NOT EC: does not have a family history of autoimmune disorders.    RESULTS/DATA     Pulmonary Function Test Results        Complete Pulmonary Function Test Pre/Post Bronchodialator (Spirometry Pre/Post/DLCO/Lung Volumes) 12/12/2024  Status: Final result     Study Result    Narrative & Impression   The FEV1/FVC (0.76) is normal. The FEV1 (3.20L/82%) is normal. The FVC is normal. Following administration of bronchodilators, there is no significant response. The TLC by body plethysmography is normal. The DLCO (98%) is normal; however, the diffusing capacity was not corrected for the patient's hemoglobin. The airways resistance is normal. FeNO was 13 ppb   Conclusion: Normal spirometry. Lung volumes are consistent with hyperinflation. The DLCO is normal. FeNO was normal.     Scans on Order [id number]      Pulmonary Function Test - Scan on 12/12/2024  5:10 PM                              Chest Radiograph     XR chest 1  view 02/07/2024    Impression  IMPRESSION:    No active disease        Chest CT Scan      CT ANGIO CHEST W AND WO IV CONTRAST; 4/24/2025       INDICATION:  Signs/Symptoms:ANEURYSM.      ,I71.21 Aneurysm of the ascending aorta, without rupture      COMPARISON:  CT chest abdomen and pelvis done on 08/11/2022, CT chest done on  12/10/2024.      ACCESSION NUMBER(S):  [id number]      ORDERING CLINICIAN:  ROSALIO CLEMONS      TECHNIQUE:  Using multi-detector CT technology, axial, sequential imaging with  prospective gating was performed of the chest following the  intravenous administration of 90 ML Omnipaque 350.      For optimization of anatomic evaluation, multiplanar reconstruction,  maximum intensity projections, and advanced 3-D off-line  postprocessing were performed on a dedicated stand-alone workstation  by the interpreting physician.      FINDINGS:  Potential study limitations:  None.      THORACIC AORTA:  The aortic root is aneurysmal measuring 4.3 x 4.1 cm in maximum  diameter. The ascending thoracic aorta measures 4.3 cm in maximum  dimension. The sinotubular junction is  preserved.  There is no evidence for acute aortic pathology, such as dissection,  intramural hematoma, or contained rupture. The arch vessel branching  pattern is conventional.  All of the arch branch vessels appear  widely patent in their proximal portions. Visualized proximal  abdominal aorta is normal. The celiac trunk, SMA and bilateral renal  arteries are normal in caliber. Minor calcified aorta atherosclerosis      REPRESENTATIVE MEASUREMENTS OF THE AORTA:  Annulus 3 x 2.2 cm  Root (Sinus of Valsalva) 4.3 x 4.1 x 3.8 cm  Sinotubular junction 3.8 cm  Mid ascending 4.3 cm  Distal ascending 3.4 cm  Mid transverse arch 2.7 cm  Isthmus 2.6 cm  Mid descending 2.3 cm  Distal descending (hiatus) 2.3 cm      CORONARY ARTERIES:  The examination is not optimized for assessment of the coronary  arteries. Normal coronary artery origins.  Right  dominant system.  Moderately calcified coronary arteries      PULMONARY ARTERIES:  The central pulmonary arteries appear normal (MPA-2.6 cm, RPA-2.1 cm,  LPA-2.2 cm).      SYSTEMIC AND PULMONARY VEINS:  Normal systemic venous and pulmonary venous return.  The SVC and IVC are of normal caliber.  Normal pulmonary venous anatomy.      CARDIAC CHAMBERS:  Normal atrioventricular and ventriculoarterial concordance.      LEFT ATRIUM:  Normal size (Area-28 cm2)      RIGHT ATRIUM:  Normal size (Area-19 cm2)      INTERATRIAL SEPTUM:  Intact.      LEFT VENTRICLE:  Normal size (BHANU-4.5 cm)      RIGHT VENTRICLE:  Dilated (BHANU-5.6 cm)      INTERVENTRICULAR SEPTUM:  Intact.      AORTIC VALVE:  The aortic valve is trileaflet in morphology. No calcifications.      MITRAL VALVE:  Mild mitral annular calcification.      PERICARDIUM:  There is no pericardial effusion or thickening.      IMPRESSION:  1. Aneurysmal dilatation of the mid ascending aorta (max 4.3 cm).  2. Aortic root is aneurysmal (4.3 cm).  3. Tri-leaflet aortic valve. Recommend screening echocardiography to  assess for aortic regurgitation.  4. There is no evidence for acute aortic pathology.  5. Recommend radiation sparing thoracic MRA and cardiac MRI to assess  size and extent of the aneurysm and aortic valve respectively in  12-18 months.  6. Moderately calcified coronary arteries. Please note this  examination is not optimized for assessment of coronary arteries      Reading Cardiologist: Dr. Addison Lobato, Date: 4/24/2025; 12:12 pm      Extracardiac/extravascular findings will be reported separately by  the radiologist.      MACRO:  None      Signed by: John Bentley 4/24/2025 6:17 PM    Echocardiogram     TRANSTHORACIC ECHOCARDIOGRAM REPORT 5/15/2025        Patient Name:       LYRIC BURCIAGA     Reading Physician:    [id number] Rosa Carty MD  Study Date:         5/15/2025           Ordering Provider:     [id number] SARAHI HERNANDEZ  MRN/PID:            [id number]            Fellow:  Accession#:         [id number]        Nurse:                Italia Gamboa RN  Date of Birth/Age:  1970 / 54      Sonographer:          Lotus Yuan RDCS                      years  Gender assigned at  M                   Additional Staff:  Birth:  Height:             182.88 cm           Admit Date:  Weight:             117.48 kg           Admission Status:     Outpatient  BSA / BMI:          2.38 m2 / 35.13     Encounter#:           [id number]                      kg/m2  Blood Pressure:     119/78 mmHg         Department Location:  [facility]     Study Type:    TRANSTHORACIC ECHO (TTE) COMPLETE  Diagnosis/ICD: Ascending aorta dilatation-I77.810  Indication:    TAA  CPT Code:      Echo Complete w Full Doppler-[id number]     Patient History:  BMI:               Obese >30  Pertinent History: TAA,HTN,CAD,LIBIA,COPD.     Study Detail: The following Echo studies were performed: 2D, M-Mode, Doppler and                color flow. Agitated saline used as a contrast agent for                intraseptal flow evaluation.        PHYSICIAN INTERPRETATION:  Left Ventricle: Left ventricular ejection fraction is normal, by visual estimate at 60-65%. There are no regional left ventricular wall motion abnormalities. The left ventricular cavity size is normal. There is mildly increased septal and normal posterior left ventricular wall thickness. Spectral Doppler shows a Grade I (impaired relaxation pattern) of left ventricular diastolic filling with normal left atrial filling pressure.  Left Atrium: The left atrium is mildly dilated. A bubble study using agitated saline was performed. Bubble study is negative. A large PFO (> 20 bubbles) was demonstrated. There is evidence of an atrial septal aneurysm.  Right Ventricle: The right ventricle is normal in size. There is normal right ventricular global  systolic function.  Right Atrium: The right atrium is normal in size.  Aortic Valve: The aortic valve is trileaflet. There is no evidence of aortic valve regurgitation. The peak instantaneous gradient of the aortic valve is 7 mmHg.  Mitral Valve: The mitral valve is normal in structure. There is mild mitral annular calcification. There is no evidence of mitral valve regurgitation.  Tricuspid Valve: The tricuspid valve is structurally normal. No evidence of tricuspid regurgitation.  Pulmonic Valve: The pulmonic valve is structurally normal. There is physiologic pulmonic valve regurgitation.  Pericardium: There is no pericardial effusion noted.  Aorta: The aortic root is normal. The thoracic aorta is mildly dilated measuring 4.3 cm.        CONCLUSIONS:   1. Left ventricular ejection fraction is normal, by visual estimate at 60-65%.   2. Spectral Doppler shows a Grade I (impaired relaxation pattern) of left ventricular diastolic filling with normal left atrial filling pressure.   3. The left atrium is mildly dilated.   4. The thoracic aorta is mildly dilated measuring 4.3 cm.   5. A bubble study using agitated saline was performed. A large PFO (> 20 bubbles) was demonstrated.   6. Atrial septal aneurysm present.     QUANTITATIVE DATA SUMMARY:     2D MEASUREMENTS:          Normal Ranges:  LAs:             4.06 cm  (2.7-4.0cm)  IVSd:            1.27 cm  (0.6-1.1cm)  LVPWd:           0.97 cm  (0.6-1.1cm)  LVIDd:           5.44 cm  (3.9-5.9cm)  LVIDs:           3.56 cm  LV Mass Index:   103 g/m2  LVEDV Index:     40 ml/m2  LV % FS          34.6 %        LEFT ATRIUM:                  Normal Ranges:  LA Vol A4C:        54.8 ml    (22+/-6mL/m2)  LA Vol A2C:        69.7 ml  LA Vol BP:         63.4 ml  LA Vol Index A4C:  23.0 ml/m2  LA Vol Index A2C:  29.3 ml/m2  LA Vol Index BP:   26.7 ml/m2  LA Area A4C:       19.0 cm2  LA Area A2C:       22.0 cm2  LA Major Axis A4C: 5.6 cm  LA Major Axis A2C: 5.9 cm  LA Volume Index:   26.0  ml/m2  LA Vol A4C:        53.2 ml  LA Vol A2C:        65.4 ml  LA Vol Index BSA:  24.9 ml/m2        RIGHT ATRIUM:                 Normal Ranges:  RA Vol A4C:        26.1 ml    (8.3-19.5ml)  RA Vol Index A4C:  11.0 ml/m2  RA Area A4C:       13.0 cm2  RA Major Axis A4C: 5.5 cm        M-MODE MEASUREMENTS:         Normal Ranges:  Ao Root:             4.10 cm (2.0-3.7cm)  LAs:                 4.10 cm (2.7-4.0cm)        AORTA MEASUREMENTS:         Normal Ranges:  Ao Sinus, d:        4.30 cm (2.1-3.5cm)  Ao STJ, d:          3.70 cm (1.7-3.4cm)  Asc Ao, d:          4.30 cm (2.1-3.4cm)  Ao Arch:            2.60 cm (2.0-3.6cm)        LV SYSTOLIC FUNCTION:                       Normal Ranges:  EF-A4C View:    58 % (>=55%)  EF-A2C View:    63 %  EF-Biplane:     61 %  EF-Visual:      63 %  LV EF Reported: 63 %        LV DIASTOLIC FUNCTION:             Normal Ranges:  MV Peak E:             0.45 m/s    (0.7-1.2 m/s)  MV Peak A:             0.68 m/s    (0.42-0.7 m/s)  E/A Ratio:             0.67        (1.0-2.2)  MV e'                  0.112 m/s   (>8.0)  MV lateral e'          0.12 m/s  MV medial e'           0.11 m/s  MV A Dur:              151.28 msec  E/e' Ratio:            4.04        (<8.0)  PulmV Sys Stephon:         53.11 cm/s  PulmV Farley Stephon:        48.40 cm/s  PulmV S/D Stephon:         1.10  PulmV A Revs Stephon:      30.40 cm/s  PulmV A Revs Dur:      154.14 msec        MITRAL VALVE:          Normal Ranges:  MV DT:        278 msec (150-240msec)        AORTIC VALVE:           Normal Ranges:  AoV Vmax:      1.33 m/s (<=1.7m/s)  AoV Peak P.1 mmHg (<20mmHg)  LVOT Max Stephon:  1.20 m/s (<=1.1m/s)  LVOT VTI:      25.51 cm  LVOT Diameter: 2.07 cm  (1.8-2.4cm)  AoV Area,Vmax: 3.02 cm2 (2.5-4.5cm2)        RIGHT VENTRICLE:  RV Basal 3.50 cm  RV Mid   1.50 cm  RV Major 6.8 cm  TAPSE:   21.2 mm  RV s'    0.16 m/s        TRICUSPID VALVE/RVSP:         Normal Ranges:  IVC Diam:             1.60 cm        PULMONIC VALVE:          Normal  Ranges:  PV Accel Time:  118 msec (>120ms)  PV Max Stephon:     1.0 m/s  (0.6-0.9m/s)  PV Max P.2 mmHg        PULMONARY VEINS:  PulmV A Revs Dur: 154.14 msec  PulmV A Revs Stephon: 30.40 cm/s  PulmV Farley Stephon:   48.40 cm/s  PulmV S/D Stephon:    1.10  PulmV Sys Stpehon:    53.11 cm/s        [id number] Rosa Carty MD  Electronically signed on 2025 at 10:19:50 AM         REVIEW OF SYSTEMS     REVIEW OF SYSTEMS  Review of Systems   Constitutional:  Positive for fatigue.   HENT:  Sneezing: breztr.    Respiratory:  Positive for cough, shortness of breath and wheezing. Negative for chest tightness.    Musculoskeletal:  Positive for arthralgias and joint swelling.   Psychiatric/Behavioral:  Positive for dysphoric mood.    All other systems reviewed and are negative.        PHYSICAL EXAM     VITAL SIGNS: There were no vitals taken for this visit.     CURRENT WEIGHT: [unfilled]  BMI: [unfilled]  PREVIOUS WEIGHTS:  Wt Readings from Last 3 Encounters:   25 117 kg (259 lb)   25 121 kg (266 lb 6.4 oz)   24 119 kg (262 lb 3.2 oz)       Physical Exam  Constitutional:       Appearance: Normal appearance.   HENT:      Head: Normocephalic and atraumatic.      Right Ear: External ear normal.      Left Ear: External ear normal.      Nose: Nose normal.      Mouth/Throat:      Mouth: Mucous membranes are moist.      Pharynx: Oropharynx is clear.   Eyes:      Extraocular Movements: Extraocular movements intact.      Conjunctiva/sclera: Conjunctivae normal.      Pupils: Pupils are equal, round, and reactive to light.   Cardiovascular:      Rate and Rhythm: Normal rate and regular rhythm.      Pulses: Normal pulses.      Heart sounds: Normal heart sounds.   Pulmonary:      Effort: Pulmonary effort is normal.      Breath sounds: Wheezing present.      Comments: Upper lobes exp wheezing   Abdominal:      General: Bowel sounds are normal.      Palpations: Abdomen is soft.   Musculoskeletal:         General: Normal range of motion.       Cervical back: Normal range of motion and neck supple.   Skin:     General: Skin is warm and dry.   Neurological:      General: No focal deficit present.      Mental Status: He is alert and oriented to person, place, and time. Mental status is at baseline.   Psychiatric:         Mood and Affect: Mood normal.         Behavior: Behavior normal.         Thought Content: Thought content normal.         Judgment: Judgment normal.         ASSESSMENT/PLAN     Mr. Valenzuela is a 55 y.o. male and  has a past medical history of Abdominal pain (04/29/2025), Acute bronchitis (04/29/2025), Acute embolism and thrombosis of unspecified vein (04/20/2015), Acute gastroenteritis (04/29/2025), Acute pancreatitis (05/04/2023), Acute sinusitis (04/29/2025), Anal fistula (05/30/2023), Anxiety (04/29/2025), Asymmetrical sensorineural hearing loss (10/03/2023), Cervical radiculopathy (04/29/2025), Chest pain (02/06/2023), Chronic pain (04/29/2025), Chronic pain syndrome (10/23/2018), Clouded consciousness (04/29/2025), Confusional state (04/29/2025), Contusion of upper limb (11/25/2013), Daytime somnolence (04/29/2025), Depression (10/11/2014), Depressive disorder (04/29/2025), Elevated d-dimer (02/06/2023), Encounter for screening for COVID-19 (01/23/2021), Epigastric pain (04/29/2025), Esophageal ulcer (04/29/2025), Excessive daytime sleepiness (04/29/2025), Fall (04/29/2025), Fibromyositis (11/11/2019), Fracture of left ankle (04/29/2025), Headache (07/01/2016), Hepatic steatosis (05/04/2023), Left ankle sprain (04/29/2025), Leukocytosis (05/04/2023), LOM (loss of memory) (04/29/2025), Lower back pain (04/29/2025), Lumbar spondylosis (04/29/2025), Magnetic resonance imaging of brain abnormal (04/29/2025), Nicotine dependence (03/26/2019), Noncompliance with medication regimen (04/29/2025), Obesity (BMI 30-39.9) (05/04/2023), Obesity, Class II, BMI 35-39.9 (05/05/2023), Other intervertebral disc degeneration, lumbar region, Personal history  of other diseases of the circulatory system, Personal history of other diseases of the nervous system and sense organs, Personal history of other specified conditions (04/19/2016), Personal history of other specified conditions (04/20/2015), Pure hypercholesterolemia (11/11/2019), Recurrent major depressive disorder, in partial remission (10/05/2023), Sleep apnea (04/29/2025), Spondylosis, unspecified, Tinnitus (10/03/2023), Tremor (04/29/2025), Tremor (04/29/2025), Urticaria (04/29/2025), Venous thrombosis (04/29/2025), Viral syndrome (01/23/2021), and White matter disease, unspecified. He was referred to the [facility] [facility] COPD and pulm nodules     Problem List and Orders      Assessment and Plan / Recommendations:  Problem List Items Addressed This Visit    None          COPD/chronic bronchitis - [facility] 2  - pulmonary function test: Normal spirometry. Lung volumes are consistent with hyperinflation. The DLCO is normal. FeNO was normal.   6 minute walk test no desaturation   CAT 20    No eosinophils       Pulm nodules - patient had CT chest at [facility] on 8/30/2024 with development of an approximately 8mm nodule medially within the DASHAWN lobe - repeat in 3 months - Nov 30   - CT chest 12/2024 - No evidence of 8 mm left upper lobe nodule features of mild smoking related airway disease. Coronary calcification.  - Repeat CT chest in 12/2025 - 4 mm stable right lower lobe nodule on image 125. Stable 5 mm nodule  in the right middle lobe on image 142. Reported 8 mm nodule seen in the left upper lobe not well appreciated  Repeat CT chest in 12 months       Tobacco abuse - 57 pack life history - current smoker   Cigarette use is harming their health, and specifically exacerbating the risk of primary lung cancer.  I have encouraged them regarding their current efforts to stop and recommended that they stop smoking entirely. I provided education and counseling regarding strategies  to quit smoking.  I addressed barriers to change and suggested strategies to avoid relapse.  I recommended community support groups, and referred them to local tobacco cessation hotlines (1-800-Quit-Now).   - now tapered to 1/2 ppd     CV: dyspnea  Reemmend F/U with Cardiology - consider echo    Sleep apnea - refer to sleep       Return to clinic after12 weeks and a or sooner if needed   Minna Gonzalez CNP  My office number is [phone] -     Call to schedule  for radiology - CT scans/PFTs etc at  [phone]  General scheduling  [phone]     Best way to get a hold of me is to call my office --> Please do not send me follow my health messages  Any test results will be discussed at next visit -- please make sure to make a follow up appt after testing.

## 2025-07-14 ENCOUNTER — APPOINTMENT (OUTPATIENT)
Facility: CLINIC | Age: 55
End: 2025-07-14
Payer: MEDICARE

## 2025-07-14 VITALS
HEIGHT: 72 IN | HEART RATE: 60 BPM | BODY MASS INDEX: 37.25 KG/M2 | OXYGEN SATURATION: 93 % | WEIGHT: 275 LBS | DIASTOLIC BLOOD PRESSURE: 76 MMHG | TEMPERATURE: 97.1 F | SYSTOLIC BLOOD PRESSURE: 118 MMHG

## 2025-07-14 DIAGNOSIS — J41.8 MIXED SIMPLE AND MUCOPURULENT CHRONIC BRONCHITIS (MULTI): Primary | ICD-10-CM

## 2025-07-14 DIAGNOSIS — G47.33 OSA (OBSTRUCTIVE SLEEP APNEA): ICD-10-CM

## 2025-07-14 DIAGNOSIS — E66.01 CLASS 2 SEVERE OBESITY WITH SERIOUS COMORBIDITY AND BODY MASS INDEX (BMI) OF 37.0 TO 37.9 IN ADULT, UNSPECIFIED OBESITY TYPE: ICD-10-CM

## 2025-07-14 DIAGNOSIS — E66.812 CLASS 2 SEVERE OBESITY WITH SERIOUS COMORBIDITY AND BODY MASS INDEX (BMI) OF 37.0 TO 37.9 IN ADULT, UNSPECIFIED OBESITY TYPE: ICD-10-CM

## 2025-07-14 DIAGNOSIS — F17.218 NICOTINE DEPENDENCE, CIGARETTES, WITH OTHER NICOTINE-INDUCED DISORDERS: ICD-10-CM

## 2025-07-14 PROCEDURE — 3008F BODY MASS INDEX DOCD: CPT | Performed by: NURSE PRACTITIONER

## 2025-07-14 PROCEDURE — 1036F TOBACCO NON-USER: CPT | Performed by: NURSE PRACTITIONER

## 2025-07-14 PROCEDURE — 3078F DIAST BP <80 MM HG: CPT | Performed by: NURSE PRACTITIONER

## 2025-07-14 PROCEDURE — 99214 OFFICE O/P EST MOD 30 MIN: CPT | Performed by: NURSE PRACTITIONER

## 2025-07-14 PROCEDURE — 3074F SYST BP LT 130 MM HG: CPT | Performed by: NURSE PRACTITIONER

## 2025-07-14 ASSESSMENT — COPD QUESTIONNAIRES
QUESTION7_SLEEPQUALITY: 3
CAT_TOTALSCORE: 29
QUESTION4_WALKINCLINE: 5 - WHEN I WALK UP A HILL OR ONE FLIGHT OF STAIRS I AM VERY BREATHLESS
QUESTION1_COUGHFREQUENCY: 5 - I COUGH ALL THE TIME
QUESTION5_HOMEACTIVITIES: 5 - I AM VERY LIMITED DOING ACTIVITIES AT HOME
QUESTION8_ENERGYLEVEL: 5 - I HAVE NO ENERGY AT ALL
QUESTION2_CHESTPHLEGM: 5 - MY CHEST IS COMPLETELY FULL OF PHLEGM (MUCUS)
QUESTION3_CHESTTIGHTNESS: 1
QUESTION6_LEAVINGHOUSE: 0 - I AM CONFIDENT LEAVING MY HOME DESPITE MY LUNG CONDITION

## 2025-07-14 ASSESSMENT — PAIN SCALES - GENERAL: PAINLEVEL_OUTOF10: 7

## 2025-07-14 NOTE — PATIENT INSTRUCTIONS
COPD/chronic bronchitis - [facility] 2  - pulmonary function test: Normal spirometry. Lung volumes are consistent with hyperinflation. The DLCO is normal. FeNO was normal.   6 minute walk test no desaturation   CAT 20  worsened 29  No eosinophils   - repeat Pfts in Dec 2025   - gained 7 lbs since last visit   - cont breztri 2 puffs twice a day, rinse and spit afterwards  - mucinex for thinning phlegm       Pulm nodules - patient had CT chest at [facility] on 8/30/2024 with development of an approximately 8mm nodule medially within the DASHAWN lobe - repeat in 3 months - Nov 30   - CT chest 12/2024 - No evidence of 8 mm left upper lobe nodule features of mild smoking related airway disease. Coronary calcification.  - Repeat CT chest in 12/2025 - 4 mm stable right lower lobe nodule on image 125. Stable 5 mm nodule  in the right middle lobe on image 142. Reported 8 mm nodule seen in the left upper lobe not well appreciated  Repeat CT chest in 12 months       Tobacco abuse - 57 pack life history - current smoker   Cigarette use is harming their health, and specifically exacerbating the risk of primary lung cancer.  I have encouraged them regarding their current efforts to stop and recommended that they stop smoking entirely. I provided education and counseling regarding strategies to quit smoking.  I addressed barriers to change and suggested strategies to avoid relapse.  I recommended community support groups, and referred them to local tobacco cessation hotlines (1-800-Quit-Now).   - now tapered to 1/2 ppd   - quit 4/2025  - recommended weight loss.     CV: dyspnea  Reemmend F/U with Cardiology - echo with + bubble study seen by Cardiology    Sleep apnea - refer to sleep not using PAP therapy       Return to clinic after12 weeks and a or sooner if needed   Minna Gonzalez CNP  My office number is [phone] -     Call to schedule  for radiology - CT scans/PFTs etc at  [phone]  General scheduling  [phone]       Any  test results will be discussed at next visit -- please make sure to make a follow up appt after testing.

## 2025-07-31 ENCOUNTER — OFFICE VISIT (OUTPATIENT)
Dept: URGENT CARE | Facility: URGENT CARE | Age: 55
End: 2025-07-31
Payer: MEDICARE

## 2025-07-31 VITALS
BODY MASS INDEX: 36.36 KG/M2 | OXYGEN SATURATION: 96 % | TEMPERATURE: 97.4 F | SYSTOLIC BLOOD PRESSURE: 134 MMHG | WEIGHT: 268.08 LBS | DIASTOLIC BLOOD PRESSURE: 78 MMHG | RESPIRATION RATE: 18 BRPM | HEART RATE: 68 BPM

## 2025-07-31 DIAGNOSIS — L23.7 POISON IVY DERMATITIS: Primary | ICD-10-CM

## 2025-07-31 PROCEDURE — 3078F DIAST BP <80 MM HG: CPT | Performed by: REGISTERED NURSE

## 2025-07-31 PROCEDURE — 3075F SYST BP GE 130 - 139MM HG: CPT | Performed by: REGISTERED NURSE

## 2025-07-31 PROCEDURE — 99203 OFFICE O/P NEW LOW 30 MIN: CPT | Performed by: REGISTERED NURSE

## 2025-07-31 RX ORDER — METHYLPREDNISOLONE 4 MG/1
TABLET ORAL
Qty: 21 TABLET | Refills: 0 | Status: SHIPPED | OUTPATIENT
Start: 2025-07-31

## 2025-07-31 RX ORDER — TRIAMCINOLONE ACETONIDE 1 MG/G
CREAM TOPICAL 2 TIMES DAILY
Qty: 30 G | Refills: 0 | Status: SHIPPED | OUTPATIENT
Start: 2025-07-31

## 2025-07-31 RX ORDER — NIACIN 500 MG/1
500 TABLET, EXTENDED RELEASE ORAL NIGHTLY
COMMUNITY

## 2025-07-31 ASSESSMENT — PAIN SCALES - GENERAL: PAINLEVEL_OUTOF10: 10-WORST PAIN EVER

## 2025-07-31 ASSESSMENT — ENCOUNTER SYMPTOMS
LOSS OF SENSATION IN FEET: 0
DEPRESSION: 0
OCCASIONAL FEELINGS OF UNSTEADINESS: 0

## 2025-07-31 NOTE — PROGRESS NOTES
Subjective   Patient ID: Marlon Valenzuela is a 55 y.o. male. They present today with a chief complaint of Other (Pt. C/O left ear swelling, having nausea, and rash on head. /Started 3 days ago. ).    History of Present Illness  HPI  55-year-old male patient presents with what he believes is poison ivy and sumac to his head, left ear and bilateral arms.  Wife states she has been applying calamine lotion but patient states he is getting little to no relief.  States his left ear feels swollen and the rash is very itchy.  He denies any ST, difficulty swallowing or breathing, no cp.    Past Medical History  Allergies as of 07/31/2025 - Reviewed 07/31/2025   Allergen Reaction Noted    Bee venom protein (honey bee) Itching 02/14/2024    Sulfa (sulfonamide antibiotics) GI Upset 01/12/2023       Prescriptions Prior to Admission[1]     Medical History[2]    Surgical History[3]     reports that he has quit smoking. His smoking use included cigarettes. He started smoking about 45 years ago. He has never used smokeless tobacco. He reports that he does not drink alcohol and does not use drugs.    Review of Systems  Review of Systems     Pertinent systems reviewed and were negative unless otherwise stated in HPI.                            Objective    Vitals:    07/31/25 1337   BP: 134/78   BP Location: Left arm   Patient Position: Sitting   BP Cuff Size: Large adult   Pulse: 68   Resp: 18   Temp: 36.3 °C (97.4 °F)   TempSrc: Oral   SpO2: 96%   Weight: 122 kg (268 lb 1.3 oz)     No LMP for male patient.    Physical Exam       VS: As documented in the triage note and EMR flowsheet from this visit was reviewed  General: Well appearing. No acute distress.  CV: Regular rhythm. No murmurs, rubs, gallops appreciated.  Resp: Clear to auscultation bilaterally. No respiratory distress.    GI: Nontender. Soft. No masses. No rebound, rigidity or guarding.  MSK: Symmetric muscle bulk. No gross step offs or deformities.  Skin: Warm, dry. Red  raised rash to head with some areas of pustules, left ear, and bilateral forearms.  Neuro: CN II-VII intact. A&O x3. Speech fluent. Alert. Moving all extremities. Ambulates with normal gait  Psych: Appropriate mood and affect for situation    Procedures    Point of Care Test & Imaging Results from this visit  No results found for this visit on 07/31/25.   Imaging  No results found.    Cardiology, Vascular, and Other Imaging  No other imaging results found for the past 2 days      Diagnostic study results (if any) were reviewed by Crystal L Severino, APRN-CNP.    Assessment/Plan   Allergies, medications, history, and pertinent labs/EKGs/Imaging reviewed by Crystal L Severino, APRN-CNP.     Medical Decision Making  The patient was evaluated for above complaints in [facility]. The patient has poison ivy dermatitis . Patient educated on treatment plan consisting of medrol dose pack and kenalog cream; patient was offered IM injection of Solumedrol but he refused. Patient provided with return precaution and can follow up with PCP if no improvement. They were provided with a work/school excuse if requested.      Orders and Diagnoses  Diagnoses and all orders for this visit:  Poison ivy dermatitis  -     methylPREDNISolone (Medrol Dospak) 4 mg tablets; Follow schedule on package instructions  -     triamcinolone (Kenalog) 0.1 % cream; Apply topically 2 times a day.      Medical Admin Record      Patient disposition: Home    Electronically signed by Crystal L Severino, APRN-CNP  1:59 PM           [1] (Not in a hospital admission)  [2]   Past Medical History:  Diagnosis Date    Abdominal pain 04/29/2025    Acute bronchitis 04/29/2025    Acute embolism and thrombosis of unspecified vein 04/20/2015    Venous thrombosis    Acute gastroenteritis 04/29/2025    Acute pancreatitis 05/04/2023    Acute sinusitis 04/29/2025    Anal fistula 05/30/2023    Anxiety 04/29/2025    Asymmetrical sensorineural hearing loss 10/03/2023    Cervical  radiculopathy 04/29/2025    Chest pain 02/06/2023    Comment on above: CHEST PAIN      Chronic pain 04/29/2025    Chronic pain syndrome 10/23/2018    Clouded consciousness 04/29/2025    Confusional state 04/29/2025    Contusion of upper limb 11/25/2013    Daytime somnolence 04/29/2025    Depression 10/11/2014    Depressive disorder 04/29/2025    Elevated d-dimer 02/06/2023    Encounter for screening for COVID-19 01/23/2021    Epigastric pain 04/29/2025    Esophageal ulcer 04/29/2025    Excessive daytime sleepiness 04/29/2025    Fall 04/29/2025    Fibromyositis 11/11/2019    Fracture of left ankle 04/29/2025    Headache 07/01/2016    Hepatic steatosis 05/04/2023    Left ankle sprain 04/29/2025    Leukocytosis 05/04/2023    LOM (loss of memory) 04/29/2025    Lower back pain 04/29/2025    Lumbar spondylosis 04/29/2025    Magnetic resonance imaging of brain abnormal 04/29/2025    Nicotine dependence 03/26/2019    Noncompliance with medication regimen 04/29/2025    Obesity (BMI 30-39.9) 05/04/2023    Obesity, Class II, BMI 35-39.9 05/05/2023    Other intervertebral disc degeneration, lumbar region     Bulging lumbar disc    Personal history of other diseases of the circulatory system     History of aortic aneurysm    Personal history of other diseases of the nervous system and sense organs     History of sleep apnea    Personal history of other specified conditions 04/19/2016    History of headache    Personal history of other specified conditions 04/20/2015    History of chest pain    Pure hypercholesterolemia 11/11/2019    Recurrent major depressive disorder, in partial remission 10/05/2023    Sleep apnea 04/29/2025    Spondylosis, unspecified     Arthritis of back    Tinnitus 10/03/2023    Tremor 04/29/2025    Tremor 04/29/2025    Urticaria 04/29/2025    Venous thrombosis 04/29/2025    Viral syndrome 01/23/2021    White matter disease, unspecified     White matter abnormality on MRI of brain   [3]   Past Surgical  History:  Procedure Laterality Date    CARDIAC CATHETERIZATION  10/21/2014    Cardiac Cath Procedure Outcome:    OTHER SURGICAL HISTORY  05/11/2017    Incision Of Perianal Abscess

## 2025-08-01 ENCOUNTER — TELEPHONE (OUTPATIENT)
Dept: URGENT CARE | Facility: URGENT CARE | Age: 55
End: 2025-08-01

## 2025-08-18 ENCOUNTER — APPOINTMENT (OUTPATIENT)
Dept: RADIOLOGY | Facility: HOSPITAL | Age: 55
End: 2025-08-18
Payer: MEDICARE

## 2025-08-18 ENCOUNTER — HOSPITAL ENCOUNTER (EMERGENCY)
Facility: HOSPITAL | Age: 55
Discharge: HOME | End: 2025-08-18
Attending: STUDENT IN AN ORGANIZED HEALTH CARE EDUCATION/TRAINING PROGRAM
Payer: MEDICARE

## 2025-08-18 ENCOUNTER — APPOINTMENT (OUTPATIENT)
Dept: CARDIOLOGY | Facility: HOSPITAL | Age: 55
End: 2025-08-18
Payer: MEDICARE

## 2025-08-18 VITALS
OXYGEN SATURATION: 93 % | TEMPERATURE: 97.5 F | SYSTOLIC BLOOD PRESSURE: 119 MMHG | WEIGHT: 268 LBS | BODY MASS INDEX: 36.3 KG/M2 | DIASTOLIC BLOOD PRESSURE: 64 MMHG | HEART RATE: 53 BPM | HEIGHT: 72 IN | RESPIRATION RATE: 18 BRPM

## 2025-08-18 DIAGNOSIS — I77.1 ILIAC ARTERY STENOSIS, RIGHT: Primary | ICD-10-CM

## 2025-08-18 DIAGNOSIS — S06.9X0A TRAUMATIC BRAIN INJURY, WITHOUT LOSS OF CONSCIOUSNESS, INITIAL ENCOUNTER (MULTI): ICD-10-CM

## 2025-08-18 LAB
ALBUMIN SERPL BCP-MCNC: 4 G/DL (ref 3.4–5)
ALP SERPL-CCNC: 50 U/L (ref 33–120)
ALT SERPL W P-5'-P-CCNC: 62 U/L (ref 10–52)
ANION GAP SERPL CALC-SCNC: 8 MMOL/L (ref 10–20)
AST SERPL W P-5'-P-CCNC: 41 U/L (ref 9–39)
BASOPHILS # BLD AUTO: 0.03 X10*3/UL (ref 0–0.1)
BASOPHILS NFR BLD AUTO: 0.4 %
BILIRUB SERPL-MCNC: 0.4 MG/DL (ref 0–1.2)
BNP SERPL-MCNC: 11 PG/ML (ref 0–99)
BUN SERPL-MCNC: 16 MG/DL (ref 6–23)
CALCIUM SERPL-MCNC: 9.1 MG/DL (ref 8.6–10.3)
CARDIAC TROPONIN I PNL SERPL HS: 3 NG/L (ref 0–20)
CARDIAC TROPONIN I PNL SERPL HS: 4 NG/L (ref 0–20)
CHLORIDE SERPL-SCNC: 106 MMOL/L (ref 98–107)
CO2 SERPL-SCNC: 24 MMOL/L (ref 21–32)
CREAT SERPL-MCNC: 0.9 MG/DL (ref 0.5–1.3)
EGFRCR SERPLBLD CKD-EPI 2021: >90 ML/MIN/1.73M*2
EOSINOPHIL # BLD AUTO: 0.14 X10*3/UL (ref 0–0.7)
EOSINOPHIL NFR BLD AUTO: 1.7 %
ERYTHROCYTE [DISTWIDTH] IN BLOOD BY AUTOMATED COUNT: 13.2 % (ref 11.5–14.5)
FLUAV RNA RESP QL NAA+PROBE: NOT DETECTED
FLUBV RNA RESP QL NAA+PROBE: NOT DETECTED
GLUCOSE SERPL-MCNC: 112 MG/DL (ref 74–99)
HCT VFR BLD AUTO: 43 % (ref 41–52)
HGB BLD-MCNC: 15.2 G/DL (ref 13.5–17.5)
IMM GRANULOCYTES # BLD AUTO: 0.01 X10*3/UL (ref 0–0.7)
IMM GRANULOCYTES NFR BLD AUTO: 0.1 % (ref 0–0.9)
LYMPHOCYTES # BLD AUTO: 3.89 X10*3/UL (ref 1.2–4.8)
LYMPHOCYTES NFR BLD AUTO: 47.9 %
MCH RBC QN AUTO: 32.2 PG (ref 26–34)
MCHC RBC AUTO-ENTMCNC: 35.3 G/DL (ref 32–36)
MCV RBC AUTO: 91 FL (ref 80–100)
MONOCYTES # BLD AUTO: 0.74 X10*3/UL (ref 0.1–1)
MONOCYTES NFR BLD AUTO: 9.1 %
NEUTROPHILS # BLD AUTO: 3.31 X10*3/UL (ref 1.2–7.7)
NEUTROPHILS NFR BLD AUTO: 40.8 %
NRBC BLD-RTO: 0 /100 WBCS (ref 0–0)
PLATELET # BLD AUTO: 211 X10*3/UL (ref 150–450)
POTASSIUM SERPL-SCNC: 4.1 MMOL/L (ref 3.5–5.3)
PROT SERPL-MCNC: 8.3 G/DL (ref 6.4–8.2)
RBC # BLD AUTO: 4.72 X10*6/UL (ref 4.5–5.9)
RSV RNA RESP QL NAA+PROBE: NOT DETECTED
SARS-COV-2 RNA RESP QL NAA+PROBE: NOT DETECTED
SODIUM SERPL-SCNC: 134 MMOL/L (ref 136–145)
WBC # BLD AUTO: 8.1 X10*3/UL (ref 4.4–11.3)

## 2025-08-18 PROCEDURE — 84075 ASSAY ALKALINE PHOSPHATASE: CPT | Performed by: STUDENT IN AN ORGANIZED HEALTH CARE EDUCATION/TRAINING PROGRAM

## 2025-08-18 PROCEDURE — 85025 COMPLETE CBC W/AUTO DIFF WBC: CPT | Performed by: STUDENT IN AN ORGANIZED HEALTH CARE EDUCATION/TRAINING PROGRAM

## 2025-08-18 PROCEDURE — 99285 EMERGENCY DEPT VISIT HI MDM: CPT | Mod: 25 | Performed by: STUDENT IN AN ORGANIZED HEALTH CARE EDUCATION/TRAINING PROGRAM

## 2025-08-18 PROCEDURE — 36415 COLL VENOUS BLD VENIPUNCTURE: CPT | Performed by: STUDENT IN AN ORGANIZED HEALTH CARE EDUCATION/TRAINING PROGRAM

## 2025-08-18 PROCEDURE — 70450 CT HEAD/BRAIN W/O DYE: CPT

## 2025-08-18 PROCEDURE — 74174 CTA ABD&PLVS W/CONTRAST: CPT | Performed by: STUDENT IN AN ORGANIZED HEALTH CARE EDUCATION/TRAINING PROGRAM

## 2025-08-18 PROCEDURE — 70450 CT HEAD/BRAIN W/O DYE: CPT | Performed by: RADIOLOGY

## 2025-08-18 PROCEDURE — 84484 ASSAY OF TROPONIN QUANT: CPT | Performed by: STUDENT IN AN ORGANIZED HEALTH CARE EDUCATION/TRAINING PROGRAM

## 2025-08-18 PROCEDURE — 2500000001 HC RX 250 WO HCPCS SELF ADMINISTERED DRUGS (ALT 637 FOR MEDICARE OP)

## 2025-08-18 PROCEDURE — 83880 ASSAY OF NATRIURETIC PEPTIDE: CPT | Performed by: STUDENT IN AN ORGANIZED HEALTH CARE EDUCATION/TRAINING PROGRAM

## 2025-08-18 PROCEDURE — 2550000001 HC RX 255 CONTRASTS: Performed by: STUDENT IN AN ORGANIZED HEALTH CARE EDUCATION/TRAINING PROGRAM

## 2025-08-18 PROCEDURE — 71045 X-RAY EXAM CHEST 1 VIEW: CPT | Performed by: RADIOLOGY

## 2025-08-18 PROCEDURE — 74174 CTA ABD&PLVS W/CONTRAST: CPT

## 2025-08-18 PROCEDURE — 73502 X-RAY EXAM HIP UNI 2-3 VIEWS: CPT | Mod: RT

## 2025-08-18 PROCEDURE — 73502 X-RAY EXAM HIP UNI 2-3 VIEWS: CPT | Mod: RIGHT SIDE | Performed by: RADIOLOGY

## 2025-08-18 PROCEDURE — 71275 CT ANGIOGRAPHY CHEST: CPT | Performed by: STUDENT IN AN ORGANIZED HEALTH CARE EDUCATION/TRAINING PROGRAM

## 2025-08-18 PROCEDURE — 71045 X-RAY EXAM CHEST 1 VIEW: CPT

## 2025-08-18 PROCEDURE — 93005 ELECTROCARDIOGRAM TRACING: CPT

## 2025-08-18 PROCEDURE — 87637 SARSCOV2&INF A&B&RSV AMP PRB: CPT

## 2025-08-18 RX ORDER — ACETAMINOPHEN 325 MG/1
650 TABLET ORAL ONCE
Status: COMPLETED | OUTPATIENT
Start: 2025-08-18 | End: 2025-08-18

## 2025-08-18 RX ADMIN — IOHEXOL 90 ML: 350 INJECTION, SOLUTION INTRAVENOUS at 14:43

## 2025-08-18 RX ADMIN — ACETAMINOPHEN 650 MG: 325 TABLET ORAL at 13:40

## 2025-08-18 ASSESSMENT — PAIN SCALES - GENERAL
PAINLEVEL_OUTOF10: 6
PAINLEVEL_OUTOF10: 6

## 2025-08-18 ASSESSMENT — PAIN DESCRIPTION - ORIENTATION: ORIENTATION: LEFT

## 2025-08-18 ASSESSMENT — PAIN DESCRIPTION - LOCATION: LOCATION: CHEST

## 2025-08-18 ASSESSMENT — PAIN - FUNCTIONAL ASSESSMENT: PAIN_FUNCTIONAL_ASSESSMENT: 0-10

## 2025-08-18 ASSESSMENT — PAIN DESCRIPTION - DESCRIPTORS
DESCRIPTORS: ACHING
DESCRIPTORS: ACHING

## 2025-08-19 LAB
ATRIAL RATE: 54 BPM
ATRIAL RATE: 56 BPM
HOLD SPECIMEN: NORMAL
P AXIS: 27 DEGREES
P AXIS: 31 DEGREES
P OFFSET: 181 MS
P OFFSET: 182 MS
P ONSET: 124 MS
P ONSET: 124 MS
PR INTERVAL: 176 MS
PR INTERVAL: 176 MS
Q ONSET: 212 MS
Q ONSET: 212 MS
QRS COUNT: 9 BEATS
QRS COUNT: 9 BEATS
QRS DURATION: 108 MS
QRS DURATION: 114 MS
QT INTERVAL: 448 MS
QT INTERVAL: 454 MS
QTC CALCULATION(BAZETT): 430 MS
QTC CALCULATION(BAZETT): 432 MS
QTC FREDERICIA: 438 MS
QTC FREDERICIA: 438 MS
R AXIS: -35 DEGREES
R AXIS: -38 DEGREES
T AXIS: 38 DEGREES
T AXIS: 45 DEGREES
T OFFSET: 436 MS
T OFFSET: 439 MS
VENTRICULAR RATE: 54 BPM
VENTRICULAR RATE: 56 BPM

## 2025-08-31 LAB
ATRIAL RATE: 54 BPM
ATRIAL RATE: 56 BPM
P AXIS: 27 DEGREES
P AXIS: 31 DEGREES
P OFFSET: 181 MS
P OFFSET: 182 MS
P ONSET: 124 MS
P ONSET: 124 MS
PR INTERVAL: 176 MS
PR INTERVAL: 176 MS
Q ONSET: 212 MS
Q ONSET: 212 MS
QRS COUNT: 9 BEATS
QRS COUNT: 9 BEATS
QRS DURATION: 108 MS
QRS DURATION: 114 MS
QT INTERVAL: 448 MS
QT INTERVAL: 454 MS
QTC CALCULATION(BAZETT): 430 MS
QTC CALCULATION(BAZETT): 432 MS
QTC FREDERICIA: 438 MS
QTC FREDERICIA: 438 MS
R AXIS: -35 DEGREES
R AXIS: -38 DEGREES
T AXIS: 38 DEGREES
T AXIS: 45 DEGREES
T OFFSET: 436 MS
T OFFSET: 439 MS
VENTRICULAR RATE: 54 BPM
VENTRICULAR RATE: 56 BPM

## 2025-10-13 ENCOUNTER — APPOINTMENT (OUTPATIENT)
Facility: CLINIC | Age: 55
End: 2025-10-13
Payer: COMMERCIAL

## (undated) DEVICE — SOLUTION, IRRIGATION, STERILE WATER, 1000 ML, POUR BOTTLE

## (undated) DEVICE — VESSEL LOOP, RED MAXI, 2 CARD

## (undated) DEVICE — SPONGE, LAP, XRAY DECT, 18IN X 18IN, W/MASTER DMT, STERILE

## (undated) DEVICE — SUTURE, ETHIBOND EXCEL 1, TAPER POINT CT-1 GREEN 30 INCH

## (undated) DEVICE — GLOVE, SURGICAL, PROTEXIS PI , 7.5, PF, LF

## (undated) DEVICE — SUTURE, SILK, 2-0, 18 IN, BR PS, BLACK

## (undated) DEVICE — SOLUTION, IRRIGATION, SODIUM CHLORIDE 0.9%, 1000 ML, POUR BOTTLE

## (undated) DEVICE — GLOVE, SURGICAL, BIOGEL, 7.5, PF, LATEX, GREEN

## (undated) DEVICE — Device

## (undated) DEVICE — SUTURE, VICRYL, 2-0, 27 IN, SH, UNDYED